# Patient Record
Sex: FEMALE | Race: WHITE | NOT HISPANIC OR LATINO | Employment: FULL TIME | URBAN - METROPOLITAN AREA
[De-identification: names, ages, dates, MRNs, and addresses within clinical notes are randomized per-mention and may not be internally consistent; named-entity substitution may affect disease eponyms.]

---

## 2017-02-23 ENCOUNTER — APPOINTMENT (OUTPATIENT)
Dept: MAMMOGRAPHY | Facility: HOSPITAL | Age: 62
End: 2017-02-23
Attending: OBSTETRICS & GYNECOLOGY

## 2017-04-06 ENCOUNTER — HOSPITAL ENCOUNTER (OUTPATIENT)
Dept: MAMMOGRAPHY | Facility: HOSPITAL | Age: 62
Discharge: HOME | End: 2017-04-06
Attending: OBSTETRICS & GYNECOLOGY | Admitting: OBSTETRICS & GYNECOLOGY

## 2017-04-06 DIAGNOSIS — R92.8 ABNORMAL MAMMOGRAM: ICD-10-CM

## 2017-04-06 PROCEDURE — G0204 DX MAMMO INCL CAD BI: HCPCS

## 2017-04-06 PROCEDURE — 77066 DX MAMMO INCL CAD BI: CPT | Performed by: RADIOLOGY

## 2017-04-06 PROCEDURE — 77062 BREAST TOMOSYNTHESIS BI: CPT | Performed by: RADIOLOGY

## 2017-04-06 PROCEDURE — G0279 TOMOSYNTHESIS, MAMMO: HCPCS

## 2018-12-28 ENCOUNTER — TRANSCRIBE ORDERS (OUTPATIENT)
Dept: ADMINISTRATIVE | Facility: HOSPITAL | Age: 63
End: 2018-12-28

## 2018-12-28 DIAGNOSIS — Z12.31 VISIT FOR SCREENING MAMMOGRAM: Primary | ICD-10-CM

## 2019-01-08 ENCOUNTER — HOSPITAL ENCOUNTER (OUTPATIENT)
Dept: MAMMOGRAPHY | Facility: HOSPITAL | Age: 64
Discharge: HOME | End: 2019-01-08
Attending: OBSTETRICS & GYNECOLOGY | Admitting: OBSTETRICS & GYNECOLOGY

## 2019-01-08 DIAGNOSIS — Z12.31 VISIT FOR SCREENING MAMMOGRAM: ICD-10-CM

## 2019-01-08 PROCEDURE — 77067 SCR MAMMO BI INCL CAD: CPT

## 2019-01-08 PROCEDURE — 77063 BREAST TOMOSYNTHESIS BI: CPT

## 2019-01-08 PROCEDURE — 77063 BREAST TOMOSYNTHESIS BI: CPT | Performed by: RADIOLOGY

## 2019-01-08 PROCEDURE — 77067 SCR MAMMO BI INCL CAD: CPT | Performed by: RADIOLOGY

## 2019-03-18 ENCOUNTER — APPOINTMENT (OUTPATIENT)
Dept: MRI IMAGING | Facility: HOSPITAL | Age: 64
End: 2019-03-18

## 2019-03-18 ENCOUNTER — HOSPITAL ENCOUNTER (OUTPATIENT)
Facility: HOSPITAL | Age: 64
Setting detail: OBSERVATION
Discharge: HOME | End: 2019-03-20
Attending: INTERNAL MEDICINE | Admitting: HOSPITALIST

## 2019-03-18 DIAGNOSIS — Z78.9 IMPAIRED MOBILITY AND ADLS: ICD-10-CM

## 2019-03-18 DIAGNOSIS — Z74.09 IMPAIRED MOBILITY AND ADLS: ICD-10-CM

## 2019-03-18 DIAGNOSIS — Z74.09 IMPAIRED FUNCTIONAL MOBILITY, BALANCE, GAIT, AND ENDURANCE: Primary | ICD-10-CM

## 2019-03-18 PROBLEM — R11.2 NAUSEA & VOMITING: Status: ACTIVE | Noted: 2019-03-18

## 2019-03-18 PROBLEM — G93.40 ACUTE ENCEPHALOPATHY: Status: ACTIVE | Noted: 2019-03-18

## 2019-03-18 PROCEDURE — 93010 ELECTROCARDIOGRAM REPORT: CPT | Performed by: INTERNAL MEDICINE

## 2019-03-18 PROCEDURE — 70551 MRI BRAIN STEM W/O DYE: CPT

## 2019-03-18 PROCEDURE — 99219 PR INITIAL OBSERVATION CARE/DAY 50 MINUTES: CPT | Performed by: FAMILY MEDICINE

## 2019-03-18 PROCEDURE — 70544 MR ANGIOGRAPHY HEAD W/O DYE: CPT

## 2019-03-18 PROCEDURE — 70547 MR ANGIOGRAPHY NECK W/O DYE: CPT

## 2019-03-18 PROCEDURE — G0378 HOSPITAL OBSERVATION PER HR: HCPCS

## 2019-03-18 PROCEDURE — 93005 ELECTROCARDIOGRAM TRACING: CPT | Performed by: FAMILY MEDICINE

## 2019-03-18 PROCEDURE — G0379 DIRECT REFER HOSPITAL OBSERV: HCPCS

## 2019-03-18 RX ORDER — ASPIRIN 81 MG/1
81 TABLET, CHEWABLE ORAL DAILY
COMMUNITY
End: 2022-08-16

## 2019-03-18 RX ORDER — ACETAMINOPHEN 650 MG/1
650 SUPPOSITORY RECTAL EVERY 4 HOURS PRN
Status: DISCONTINUED | OUTPATIENT
Start: 2019-03-18 | End: 2019-03-20 | Stop reason: HOSPADM

## 2019-03-18 RX ORDER — ASPIRIN 81 MG/1
81 TABLET, CHEWABLE ORAL DAILY
Status: DISCONTINUED | OUTPATIENT
Start: 2019-03-18 | End: 2019-03-20 | Stop reason: HOSPADM

## 2019-03-18 RX ORDER — MAGNESIUM SULFATE HEPTAHYDRATE 40 MG/ML
2 INJECTION, SOLUTION INTRAVENOUS AS NEEDED
Status: DISCONTINUED | OUTPATIENT
Start: 2019-03-18 | End: 2019-03-20 | Stop reason: HOSPADM

## 2019-03-18 RX ORDER — SODIUM CHLORIDE 0.9 % (FLUSH) 0.9 %
3-10 SYRINGE (ML) INJECTION AS NEEDED
Status: DISCONTINUED | OUTPATIENT
Start: 2019-03-18 | End: 2019-03-20 | Stop reason: HOSPADM

## 2019-03-18 RX ORDER — CALCIUM CARBONATE 200(500)MG
2 TABLET,CHEWABLE ORAL 2 TIMES DAILY PRN
Status: DISCONTINUED | OUTPATIENT
Start: 2019-03-18 | End: 2019-03-20 | Stop reason: HOSPADM

## 2019-03-18 RX ORDER — POTASSIUM CHLORIDE 1.5 G/1.77G
40 POWDER, FOR SOLUTION ORAL AS NEEDED
Status: DISCONTINUED | OUTPATIENT
Start: 2019-03-18 | End: 2019-03-20 | Stop reason: HOSPADM

## 2019-03-18 RX ORDER — POTASSIUM CHLORIDE 750 MG/1
40 CAPSULE, EXTENDED RELEASE ORAL AS NEEDED
Status: DISCONTINUED | OUTPATIENT
Start: 2019-03-18 | End: 2019-03-20 | Stop reason: HOSPADM

## 2019-03-18 RX ORDER — BISACODYL 5 MG/1
5 TABLET, DELAYED RELEASE ORAL DAILY PRN
Status: DISCONTINUED | OUTPATIENT
Start: 2019-03-18 | End: 2019-03-20 | Stop reason: HOSPADM

## 2019-03-18 RX ORDER — CHOLECALCIFEROL (VITAMIN D3) 125 MCG
5 CAPSULE ORAL NIGHTLY PRN
Status: DISCONTINUED | OUTPATIENT
Start: 2019-03-18 | End: 2019-03-20 | Stop reason: HOSPADM

## 2019-03-18 RX ORDER — MAGNESIUM SULFATE HEPTAHYDRATE 40 MG/ML
4 INJECTION, SOLUTION INTRAVENOUS AS NEEDED
Status: DISCONTINUED | OUTPATIENT
Start: 2019-03-18 | End: 2019-03-20 | Stop reason: HOSPADM

## 2019-03-18 RX ORDER — BISACODYL 10 MG
10 SUPPOSITORY, RECTAL RECTAL DAILY PRN
Status: DISCONTINUED | OUTPATIENT
Start: 2019-03-18 | End: 2019-03-20 | Stop reason: HOSPADM

## 2019-03-18 RX ORDER — POTASSIUM CHLORIDE 7.45 MG/ML
10 INJECTION INTRAVENOUS
Status: DISCONTINUED | OUTPATIENT
Start: 2019-03-18 | End: 2019-03-20 | Stop reason: HOSPADM

## 2019-03-18 RX ORDER — ONDANSETRON 2 MG/ML
4 INJECTION INTRAMUSCULAR; INTRAVENOUS EVERY 6 HOURS PRN
Status: DISCONTINUED | OUTPATIENT
Start: 2019-03-18 | End: 2019-03-20 | Stop reason: HOSPADM

## 2019-03-18 RX ORDER — ATORVASTATIN CALCIUM 40 MG/1
80 TABLET, FILM COATED ORAL NIGHTLY
Status: DISCONTINUED | OUTPATIENT
Start: 2019-03-18 | End: 2019-03-20 | Stop reason: HOSPADM

## 2019-03-18 RX ORDER — ACETAMINOPHEN 325 MG/1
650 TABLET ORAL EVERY 4 HOURS PRN
Status: DISCONTINUED | OUTPATIENT
Start: 2019-03-18 | End: 2019-03-20 | Stop reason: HOSPADM

## 2019-03-18 RX ORDER — SODIUM CHLORIDE 0.9 % (FLUSH) 0.9 %
3 SYRINGE (ML) INJECTION EVERY 12 HOURS SCHEDULED
Status: DISCONTINUED | OUTPATIENT
Start: 2019-03-18 | End: 2019-03-20 | Stop reason: HOSPADM

## 2019-03-18 RX ADMIN — ATORVASTATIN CALCIUM 80 MG: 40 TABLET, FILM COATED ORAL at 20:58

## 2019-03-18 RX ADMIN — ASPIRIN 81 MG 81 MG: 81 TABLET ORAL at 21:00

## 2019-03-18 RX ADMIN — SODIUM CHLORIDE, PRESERVATIVE FREE 3 ML: 5 INJECTION INTRAVENOUS at 20:59

## 2019-03-18 NOTE — PLAN OF CARE
Problem: Stroke (Ischemic) (Adult)  Goal: Signs and Symptoms of Listed Potential Problems Will be Absent, Minimized or Managed (Stroke)  Outcome: Ongoing (interventions implemented as appropriate)      Problem: Fall Risk (Adult)  Goal: Absence of Fall  Outcome: Ongoing (interventions implemented as appropriate)

## 2019-03-18 NOTE — HOSPITAL COURSE
4           [facility]  HISTORY AND PHYSICAL    Patient Name: Alexandrea Wiggins  : 1955  MRN: [id number]  Primary Care Physician: South Dickerson MD  Date of admission: 3/18/2019      Subjective   Subjective     Chief Complaint:  Acute encephalopathy    HPI:  Alexandrea Wiggins is a 63 y.o. female with essentially no past medical history.  This morning at approximately 5 AM she notified her  that she had been up during the night with nausea and had vomited 5 times.  He did notice the trash can beside her side of the bed before he went to work.  At about 645 the patient called her son-in-law and told him she would not be able to take her grandchildren to school because she had been upset all night.  She told him she was going to go back to bed and take a nap.  At approximately 745 the patient called the same son-in-law and told him she had overslept and was worried the children would be late for school.  She did not remember calling earlier and she also did not remember stating that she had been up with nausea and vomiting during the night.  15 minutes later she called him again, once again not remembering the 2 previous phone calls.    Shortly after, the patient's  returned home and again the patient denied any memory of having been up during the night with nausea and vomiting.  She also did not remember events of the previous day.  The decision was made to take the patient to the local emergency department which was at [facility].    At the outside ER, CT of the head is reported as nonacute.  UA and UDS were both negative.  WBC 9.2, H/H 14.7/44.8.  Platelets 263.  Sodium 137, potassium 3.8, glucose 123, creatinine 0.7.  Alcohol level was negative.  Magnesium 1.8.  TSH 0.9.  Troponin was less than 0.017.  The patient was oriented to person and self, but thought it was February.      She was sent to [facility] for higher level of care as well as  neurology consultation.    Review of Systems   The patient has otherwise been in her typical state of health.  No fever, chills, sweats, headache, Sz, LOC, cough, SOA, CP, abdominal pain, N/V/D/constipation, no skin breakdown.  No recent stressors.      Otherwise complete ROS reviewed and is negative except as mentioned in the HPI.    Personal History     History reviewed. No pertinent past medical history.    Past Surgical History:   Procedure Laterality Date   • REDUCTION MAMMAPLASTY         Family History: family history includes Breast cancer (age of onset: 53) in her sister; Heart disease in her father; Hypertension in her father and mother. Otherwise pertinent FHx was reviewed and unremarkable.     Social History:  reports that she has quit smoking. She does not have any smokeless tobacco history on file. She reports that she drinks alcohol. She reports that she does not use drugs.  Social History     Social History Narrative   • Not on file       Medications:    Available home medication information reviewed.  Medications Prior to Admission   Medication Sig Dispense Refill Last Dose   • aspirin 81 MG chewable tablet Chew 81 mg Daily.   3/17/2019 at Unknown time       No Known Allergies    Objective   Objective     Vital Signs:   Temp:  [98.5 °F (36.9 °C)] 98.5 °F (36.9 °C)  Resp:  [20] 20   Total (NIH Stroke Scale): 1    Physical Exam   Constitutional: Awake, alert  Eyes: PERRLA, sclerae anicteric, no conjunctival injection  HENT: NCAT, mucous membranes moist  Neck: Supple, no thyromegaly, no lymphadenopathy, trachea midline  Respiratory: Clear to auscultation bilaterally, nonlabored respirations   Cardiovascular: RRR, 2/6 systolic murmur, palpable pedal pulses bilaterally  Gastrointestinal: Positive bowel sounds, soft, nontender, nondistended  Musculoskeletal: No bilateral ankle edema, no clubbing or cyanosis to extremities  Psychiatric: Appropriate affect, cooperative  Neurologic: Oriented x 3, strength  symmetric in all extremities, Cranial Nerves grossly intact to confrontation, speech clear  Skin: No rashes    The patient has recovered her memory of yesterday, however family at bedside states she is still asking repeated questions and is more forgetful than usual.      Results Reviewed:  I have personally reviewed current lab, radiology, and data and agree.              Invalid input(s):  ALKPHOS  CrCl cannot be calculated (No order found.).  Brief Urine Lab Results     None        No results found for: BNP  Imaging Results (last 24 hours)     ** No results found for the last 24 hours. **             Assessment/Plan   Assessment / Plan     Active Hospital Problems    Diagnosis Date Noted   • **Acute encephalopathy [G93.40] 03/18/2019   • Nausea & vomiting [R11.2] 03/18/2019         Assessment/plan:  Ms. mcconnell is a 63-year-old  woman with essentially no past medical history who presented today with acute encephalopathy after a reported episode of nausea and vomiting.  Her mental status is improving but not quite back to baseline.    Acute encephalopathy:  -MRI brain without contrast, MRA head and neck without contrast  -Transthoracic echocardiogram  -Neurology consultation  -Every 4 hours neuro checks  -Aspirin and statin therapy  -Check A1c and lipid panel in the morning  -Consider TIA versus stroke versus TGA    Nausea/vomiting:  -Seems to be resolved    DVT prophylaxis:  Mechanical    CODE STATUS:    Code Status and Medical Interventions:   Ordered at: 03/18/19 1758     Code Status:    CPR     Medical Interventions (Level of Support Prior to Arrest):    Full       OBSERVATION status, however if further evaluation or treatment plans warrant, status may change.  Based upon current information, I predict patient's care encounter to be less than or equal to 2 midnights.      Electronically signed by Alejandrina Pierre MD, 03/18/19, 5:58 PM.

## 2019-03-18 NOTE — H&P
4           [facility]  HISTORY AND PHYSICAL    Patient Name: Alexandrea Wiggins  : 1955  MRN: [id number]  Primary Care Physician: South Dickerson MD  Date of admission: 3/18/2019      Subjective   Subjective     Chief Complaint:  Acute encephalopathy    HPI:  Alexandrea Wiggins is a 63 y.o. female with essentially no past medical history.  She awakened her  5 AM today to tell him she had been up during the night with nausea and vomiting and that she had vomited 5 times.  Prior to his departure for work he did notice that the trash can was placed by her side of the bed.  At approximately 645 the patient called her son-in-law to let him know that she had been up sick during the night and she would not be able to take the grandchildren to school.  She had told her son-in-law she was going to go back to bed and take a nap.  At approximately 745 she called her son-in-law again and stated somehow she had overslept and was afraid the children were going to be late to school.  She did not remember the previous conversation and did not remember telling him she had been up sick through the night.  Approximately 15 minutes later she called again stating that she was not able to  the grandchildren and was worried they would be late for school, once again forgetting the previous 2 conversations.  At approximately 830 the patient's  came home and he to found that she did not recall the events of the night or previous telephone conversations.  The family took her to the local emergency department which was at [facility].    Her to CT of the head was read as nonacute.  UDS and UA were both negative.  WBC 9.2, H/H 14.7/44.8 platelets 263.  Sodium 137, potassium 3.8, glucose 123, creatinine 0.7, liver function studies within normal limits.  Alcohol level was negative.  Magnesium 1.8, TSH 0.90, troponin negative.  The patient was alert and oriented to  self and place however, she thought it was February.  Additionally she could not recall the events of the day prior.    The decision was made to send her to [facility] for higher level of care and neurology consultation.    Review of Systems   No fever, chills, diaphoresis, URI symptoms, CP, SOA, cough, abdominal pain, N/V/D/constipation.  No dysuria.  No leg swelling.  No skin issues.  No new stressors.      Otherwise complete ROS reviewed and is negative except as mentioned in the HPI.    Personal History     History reviewed. No pertinent past medical history.    Past Surgical History:   Procedure Laterality Date   • REDUCTION MAMMAPLASTY         Family History: family history includes Breast cancer (age of onset: 53) in her sister; Heart disease in her father; Hypertension in her father and mother. Otherwise pertinent FHx was reviewed and unremarkable.     Social History:  reports that she has quit smoking. She does not have any smokeless tobacco history on file. She reports that she drinks alcohol. She reports that she does not use drugs.  Social History     Social History Narrative   • Not on file       Medications:    Available home medication information reviewed.  Medications Prior to Admission   Medication Sig Dispense Refill Last Dose   • aspirin 81 MG chewable tablet Chew 81 mg Daily.   3/17/2019 at Unknown time       No Known Allergies    Objective   Objective     Vital Signs:   Temp:  [98.5 °F (36.9 °C)] 98.5 °F (36.9 °C)  Resp:  [20] 20   Total (NIH Stroke Scale): 1    Physical Exam   Constitutional: Awake, alert  Eyes: PERRLA, sclerae anicteric, no conjunctival injection  HENT: NCAT, mucous membranes moist  Neck: Supple, no thyromegaly, no lymphadenopathy, trachea midline  Respiratory: Clear to auscultation bilaterally, nonlabored respirations   Cardiovascular: RRR, 2/6 systolic murmur, palpable pedal pulses bilaterally  Gastrointestinal: Positive bowel sounds, soft, nontender,  nondistended  Musculoskeletal: No bilateral ankle edema, no clubbing or cyanosis to extremities  Psychiatric: Appropriate affect, cooperative  Neurologic: Oriented x 3, strength symmetric in all extremities, Cranial Nerves grossly intact to confrontation, speech clear  Skin: No rashes    Patient's mental status greatly improved, but family at bedside states she is still very forgetful, asking similar questions repeatedly.      Results Reviewed:  I have personally reviewed current lab, radiology, and data and agree.              Invalid input(s):  ALKPHOS  CrCl cannot be calculated (No order found.).  Brief Urine Lab Results     None        No results found for: BNP  Imaging Results (last 24 hours)     ** No results found for the last 24 hours. **             Assessment/Plan   Assessment / Plan     Active Hospital Problems    Diagnosis Date Noted   • **Acute encephalopathy [G93.40] 03/18/2019   • Nausea & vomiting [R11.2] 03/18/2019         A/P:  Ms Wiggins is a 63 year old with essentially no medical history who presented to [facility] ER with acute encephalopathy with reported history of N/V through the night.    Acute encephalopathy:  - MRI brain, MRA head and neck  - TTE  - Neurology consultation  - Q4h neuro checks  - A1C and lipid panel in AM  - statin and ASA  - TIA versus stroke versus TGA    N/V, now resolved      DVT prophylaxis:  Mechanical    CODE STATUS:    Code Status and Medical Interventions:   Ordered at: 03/18/19 1758     Code Status:    CPR     Medical Interventions (Level of Support Prior to Arrest):    Full       OBSERVATION status, however if further evaluation or treatment plans warrant, status may change.  Based upon current information, I predict patient's care encounter to be less than or equal to 2 midnights.        Electronically signed by Alejandrina Pierre MD, 03/18/19, 6:15 PM.

## 2019-03-18 NOTE — NURSING NOTE
"  ACC REVIEW REPORT: [facility]        PATIENT NAME: Alexandrea Wiggins    PATIENT ID: [id number]    BED:     BED TYPE: TELE    BED GIVEN TO: JESSICA GALAN RN    TIME BED GIVEN:1220    YOB: 1955    AGE: 63 yrs    GENDER:FEMALE    PREVIOUS ADMIT TO [facility]:     PREVIOUS ADMISSION DATE:     PATIENT CLASS:     TODAY'S DATE: 3/18/2019    TRANSFER DATE: 03/18/2019    ETA:     TRANSFERRING FACILITY: [facility]    TRANSFERRING FACILITY PHONE # : [phone]    TRANSFERRING MD:     DATE/TIME REQUEST RECEIVED: 03/18/2019 @ 1152    [facility] RN: ABBIE PERZE    REPORT FROM: GERALDO GALAN RN     TIME REPORT TAKEN: 1218    DIAGNOSIS: AMS, POSSIBLE STROKE    REASON FOR TRANSFER TO [facility]: HIGHER LEVEL CARE    TRANSPORTATION: GROUND    CLINICAL REASON FOR TRANSFER TO [facility]: PATIENT IS NORMALLY A HEALTHY, ACTIVE PERSON, WHO IS STILL WORKING.  THIS MORNING ABOUT 5 AM, SHE WOKE UP WITH MENTAL STATUS CHANGES.  SHE TOLD HER  THAT SHE VOMITED 5 TIMES IN THE TRASH CAN.  SHE TOLD HER  THAT HE WAS GOING TO HAVE TO TAKE THE KIDS TO SCHOOL BECAUSE SHE DIDN'T FEEL WELL ENOUGH.  THEN SHE CALLED EACH FAMILY AND TOLD THEM THE SAME THING.  HER SON WAS GOING TO TAKE HER TO THE ER AND SHE WANTED TO SHOWER FIRST.  THEN SHE PROCEEDED TO JUST SIT DOWN IN THE BATHTUB WITHOUT ANY FURTHER ADIEU.  SHE IS UNABLE TO REMEMBER ANYTHING THAT HAPPENED YESTERDAY OR THE DAY BEFORE.  SHE IS ORIENTED TO HERSELF AND PLACE BUT THOUGHT THE MONTH WAS February.   CT HEAD IS NEGATIVE AND THERE ARE NO OTHER DEFICITS NOTED.  REPORTED THAT HER LAB RESULTS ARE NORMAL.  SHE TAKES NO MEDICATIONS.  SHE SAYS THAT HER HEAD FEELS \"FUZZY\" BUT DENIES HAVING A HEADACHE.  HER EKG SHOWED NSR.  HER ONLY HISTORY IS THAT SHE HAD A BREAST REDUCTION YEARS AGO.    HER URINE IS NORMAL AND HER UDS WAS NORMAL.        CLINICAL INFORMATION    HEIGHT:     WEIGHT:     ALLERGIES: NKDA    UP:     INFECTIOUS DISEASE:     ISOLATION:     1ST VITAL SIGNS:   TIME: "   TEMP:   PULSE:   B/P:   RESP:     LAST VITAL SIGNS:  TIME: 1230  TEMP: 98.1  PULSE: 83  B/P: 157/73  RESP: 18  97% ON RMA    LAB INFORMATION: WNL PER REPORT.    CULTURE INFORMATION:     MEDS/IV FLUIDS: # 20 G RAC WITH FLUIDS @ 250/ML/HR      CARDIAC SYSTEM:    CHEST PAIN:     RATE:     SCALE:     RHYTHM: NSR    Is patient taking or has patient been given any drugs that could increase bleeding? NO, SHE TAKES NO MEDS  (Plavix, Brilinta, Effient, Eliquis, Xarelto, Warfarin, Integrilin, Angiomax)    DRUG:      DOSE/FREQUENCY:     CARDIAC ENZYMES:    DATE:   TIME:   CK:   CKMB:   TJ:   TROP:     DATE:   TIME:   CK:   CKMB:   TJ:   TROP:         CARDIAC NOTES: NSR      RESPIRATORY SYSTEM:    LUNG SOUNDS:    CLEAR: YES  CRACKLES:   WHEEZES:   RHONCHI:   DIMINISHED:     ABG DATE:         ABG TIME:     ABG RESULTS:    PH:   PO2:   PCO2:   HCO3:   O2 SAT:           OXYGEN:     O2 SAT:     ADMINISTRATION ROUTE:     IMAGING FINDINGS:     PNEUMO LOCATION:     PNEUMO SIZE:     PNEUMO CHEST TUBE SEAL TYPE:     RADIOLOGY RESULTS:     RESPIRATORY STATUS:       CNS/MUSCULOSKELETAL    ALERT AND ORIENTED:    PERSON: YES  PLACE: YES  TIME: THINKS IT'S February.      INJURY:  WHERE:       STROKE SCALE:     SIZE OF HEMORRHAGE:     SYMPTOMS: (CHOOSE APPROPRIATE)    ASPHASIA:   ATAXIA:   DYSARTHRIA:   DYSPHASIA:   HEADACHE:   PARALYSIS:   SEIZURE:   SYNCOPE:   VERTIGO:   VISION CHANGE:        EXTREMITY WEAKNESS:    LEFT ARM:   RIGHT ARM:   LEFT LEG:   RIGHT LEG:     CAT SCAN RESULTS: NEGATIVE PER REPORT    MRI RESULTS:     CNS/MUSCULOSKELETAL NOTES:       GI//GY      ABDOMINAL PAIN:     VOMITING:     DIARRHEA:     NAUSEA:     BOWEL SOUNDS:     OCCULT STOOL:     VAGINAL BLEEDING:     TESTICULAR PAIN:     HEMATURIA:     NG TUBE:    SIZE:   DATE INSERTED:       ULTRASOUND:     ULTRASOUND RESULTS:       ACUTE ABDOMEN:     ACUTE ABDOMEN RESULTS:       CT SCAN:     CT SCAN RESULTS:       GI//GY NOTES:     PAST MEDICAL HISTORY: BREAST  REDUCTION, YEARS AGO.    OTHER SYMPTOM NOTES:     ADDITIONAL NOTES:           Ashly Kulkarni, RN  3/18/2019  12:04 PM

## 2019-03-19 ENCOUNTER — APPOINTMENT (OUTPATIENT)
Dept: CARDIOLOGY | Facility: HOSPITAL | Age: 64
End: 2019-03-19

## 2019-03-19 ENCOUNTER — APPOINTMENT (OUTPATIENT)
Dept: NEUROLOGY | Facility: HOSPITAL | Age: 64
End: 2019-03-19

## 2019-03-19 LAB
ANION GAP SERPL CALCULATED.3IONS-SCNC: 5 MMOL/L (ref 3–11)
ARTICHOKE IGE QN: 98 MG/DL (ref 0–130)
BH CV ECHO MEAS - AO ROOT AREA (BSA CORRECTED): 1.6
BH CV ECHO MEAS - AO ROOT AREA: 6.3 CM^2
BH CV ECHO MEAS - AO ROOT DIAM: 2.8 CM
BH CV ECHO MEAS - BSA(HAYCOCK): 1.9 M^2
BH CV ECHO MEAS - BSA: 1.8 M^2
BH CV ECHO MEAS - BZI_BMI: 28.7 KILOGRAMS/M^2
BH CV ECHO MEAS - BZI_METRIC_HEIGHT: 162.6 CM
BH CV ECHO MEAS - BZI_METRIC_WEIGHT: 75.8 KG
BH CV ECHO MEAS - EDV(CUBED): 72.7 ML
BH CV ECHO MEAS - EDV(TEICH): 77.4 ML
BH CV ECHO MEAS - EF(CUBED): 84.1 %
BH CV ECHO MEAS - EF(TEICH): 77.6 %
BH CV ECHO MEAS - ESV(CUBED): 11.6 ML
BH CV ECHO MEAS - ESV(TEICH): 17.4 ML
BH CV ECHO MEAS - FS: 45.8 %
BH CV ECHO MEAS - IVS/LVPW: 0.97
BH CV ECHO MEAS - IVSD: 1 CM
BH CV ECHO MEAS - LA DIMENSION: 3.2 CM
BH CV ECHO MEAS - LA/AO: 1.1
BH CV ECHO MEAS - LAD MAJOR: 4.6 CM
BH CV ECHO MEAS - LAT PEAK E' VEL: 9.3 CM/SEC
BH CV ECHO MEAS - LATERAL E/E' RATIO: 9.6
BH CV ECHO MEAS - LV MASS(C)D: 140.7 GRAMS
BH CV ECHO MEAS - LV MASS(C)DI: 77.7 GRAMS/M^2
BH CV ECHO MEAS - LVIDD: 4.2 CM
BH CV ECHO MEAS - LVIDS: 2.3 CM
BH CV ECHO MEAS - LVPWD: 1 CM
BH CV ECHO MEAS - MED PEAK E' VEL: 5.6 CM/SEC
BH CV ECHO MEAS - MEDIAL E/E' RATIO: 15.9
BH CV ECHO MEAS - MV A MAX VEL: 94.3 CM/SEC
BH CV ECHO MEAS - MV DEC SLOPE: 428.6 CM/SEC^2
BH CV ECHO MEAS - MV DEC TIME: 0.19 SEC
BH CV ECHO MEAS - MV E MAX VEL: 91.3 CM/SEC
BH CV ECHO MEAS - MV E/A: 0.97
BH CV ECHO MEAS - MV P1/2T MAX VEL: 135.7 CM/SEC
BH CV ECHO MEAS - MV P1/2T: 92.8 MSEC
BH CV ECHO MEAS - MVA P1/2T LCG: 1.6 CM^2
BH CV ECHO MEAS - MVA(P1/2T): 2.4 CM^2
BH CV ECHO MEAS - PA ACC SLOPE: 627.8 CM/SEC^2
BH CV ECHO MEAS - PA ACC TIME: 0.1 SEC
BH CV ECHO MEAS - PA PR(ACCEL): 32.7 MMHG
BH CV ECHO MEAS - RV MAX PG: 0.94 MMHG
BH CV ECHO MEAS - RV V1 MAX: 48.4 CM/SEC
BH CV ECHO MEAS - SI(CUBED): 33.8 ML/M^2
BH CV ECHO MEAS - SI(TEICH): 33.2 ML/M^2
BH CV ECHO MEAS - SV(CUBED): 61.2 ML
BH CV ECHO MEAS - SV(TEICH): 60.1 ML
BH CV ECHO MEAS - TAPSE (>1.6): 2.1 CM2
BH CV ECHO MEASUREMENTS AVERAGE E/E' RATIO: 12.26
BH CV XLRA - RV BASE: 3.5 CM
BH CV XLRA - RV LENGTH: 6 CM
BH CV XLRA - RV MID: 3 CM
BH CV XLRA - TDI S': 18 CM/SEC
BUN BLD-MCNC: 13 MG/DL (ref 9–23)
BUN/CREAT SERPL: 16.9 (ref 7–25)
CALCIUM SPEC-SCNC: 8.8 MG/DL (ref 8.7–10.4)
CHLORIDE SERPL-SCNC: 107 MMOL/L (ref 99–109)
CHOLEST SERPL-MCNC: 190 MG/DL (ref 0–200)
CO2 SERPL-SCNC: 27 MMOL/L (ref 20–31)
CREAT BLD-MCNC: 0.77 MG/DL (ref 0.6–1.3)
GFR SERPL CREATININE-BSD FRML MDRD: 75 ML/MIN/1.73
GLUCOSE BLD-MCNC: 96 MG/DL (ref 70–100)
GLUCOSE BLDC GLUCOMTR-MCNC: 84 MG/DL (ref 70–130)
GLUCOSE BLDC GLUCOMTR-MCNC: 87 MG/DL (ref 70–130)
HBA1C MFR BLD: 5.7 % (ref 4.8–5.6)
HDLC SERPL-MCNC: 79 MG/DL (ref 40–60)
LEFT ATRIUM VOLUME INDEX: 23.2 ML/M^2
LEFT ATRIUM VOLUME: 42 ML
LV EF 2D ECHO EST: 60 %
MAXIMAL PREDICTED HEART RATE: 156 BPM
POTASSIUM BLD-SCNC: 4.4 MMOL/L (ref 3.5–5.5)
SODIUM BLD-SCNC: 139 MMOL/L (ref 132–146)
STRESS TARGET HR: 133 BPM
TRIGL SERPL-MCNC: 62 MG/DL (ref 0–150)

## 2019-03-19 PROCEDURE — 99244 OFF/OP CNSLTJ NEW/EST MOD 40: CPT | Performed by: PSYCHIATRY & NEUROLOGY

## 2019-03-19 PROCEDURE — G0378 HOSPITAL OBSERVATION PER HR: HCPCS

## 2019-03-19 PROCEDURE — 80048 BASIC METABOLIC PNL TOTAL CA: CPT | Performed by: FAMILY MEDICINE

## 2019-03-19 PROCEDURE — 82962 GLUCOSE BLOOD TEST: CPT

## 2019-03-19 PROCEDURE — 93306 TTE W/DOPPLER COMPLETE: CPT

## 2019-03-19 PROCEDURE — 97165 OT EVAL LOW COMPLEX 30 MIN: CPT

## 2019-03-19 PROCEDURE — 95816 EEG AWAKE AND DROWSY: CPT

## 2019-03-19 PROCEDURE — 97161 PT EVAL LOW COMPLEX 20 MIN: CPT

## 2019-03-19 PROCEDURE — 99225 PR SBSQ OBSERVATION CARE/DAY 25 MINUTES: CPT | Performed by: HOSPITALIST

## 2019-03-19 PROCEDURE — 80061 LIPID PANEL: CPT | Performed by: FAMILY MEDICINE

## 2019-03-19 PROCEDURE — 93306 TTE W/DOPPLER COMPLETE: CPT | Performed by: INTERNAL MEDICINE

## 2019-03-19 PROCEDURE — 83036 HEMOGLOBIN GLYCOSYLATED A1C: CPT | Performed by: FAMILY MEDICINE

## 2019-03-19 RX ADMIN — MELATONIN TAB 5 MG 5 MG: 5 TAB at 22:20

## 2019-03-19 RX ADMIN — ATORVASTATIN CALCIUM 80 MG: 40 TABLET, FILM COATED ORAL at 22:20

## 2019-03-19 RX ADMIN — ASPIRIN 81 MG 81 MG: 81 TABLET ORAL at 08:32

## 2019-03-19 RX ADMIN — SODIUM CHLORIDE, PRESERVATIVE FREE 3 ML: 5 INJECTION INTRAVENOUS at 22:21

## 2019-03-19 RX ADMIN — SODIUM CHLORIDE, PRESERVATIVE FREE 3 ML: 5 INJECTION INTRAVENOUS at 08:32

## 2019-03-19 NOTE — CONSULTS
Patient does not meet diabetes education order criteria, therefore patient was not seen for diabetes education at this time.  Please re consult as needed. Current A1C is 5.7% Thank you for this referral.

## 2019-03-19 NOTE — PLAN OF CARE
Problem: Patient Care Overview  Goal: Plan of Care Review  Outcome: Outcome(s) achieved Date Met: 03/19/19 03/19/19 0920   Coping/Psychosocial   Plan of Care Reviewed With patient;daughter   OTHER   Outcome Summary Pt did not demonstrate any motor , balance, sensory or functional deficits during eval. No further OT warranted at this time.       Problem: Stroke (Ischemic) (Adult)  Goal: Signs and Symptoms of Listed Potential Problems Will be Absent, Minimized or Managed (Stroke)  Outcome: Outcome(s) achieved Date Met: 03/19/19 03/19/19 0920   Goal/Outcome Evaluation   Problems Assessed (Stroke (Ischemic)) acute neurologic deterioration;communication impairment;cognitive impairment;motor/sensory impairment;muscle tone abnormal   Problems Assessed (Stroke (Ischemic)) none

## 2019-03-19 NOTE — PROGRESS NOTES
[facility] Medicine Services  PROGRESS NOTE    Patient Name: Alexandrea Wiggins  : 1955  MRN: [id number]    Date of Admission: 3/18/2019  Length of Stay: 0  Primary Care Physician: [place] Dickerson MD    Subjective   Subjective     CC:  Acute Altered Mental Status    HPI:  Seen by Neurology today.  EEG pending.  Several family members in room today.    Review of Systems    Gen- No fevers, chills  CV- No chest pain, palpitations  Resp- No cough, dyspnea  GI- No N/V/D, abd pain    Otherwise ROS is negative except as mentioned in the HPI.    Objective   Objective     Vital Signs:   Temp:  [97.4 °F (36.3 °C)-98.3 °F (36.8 °C)] 98.3 °F (36.8 °C)  Heart Rate:  [71-81] 71  Resp:  [15-17] 17  BP: (112-152)/(48-75) 135/75  Total (NIH Stroke Scale): 0     Physical Exam:    Constitutional: No acute distress, awake, alert  HENT: NCAT, mucous membranes moist  Respiratory: Clear to auscultation bilaterally, respiratory effort normal   Cardiovascular: RRR, no murmurs, rubs, or gallops, palpable pedal pulses bilaterally  Gastrointestinal: Positive bowel sounds, soft, nontender, nondistended  Musculoskeletal: No bilateral ankle edema  Psychiatric: Appropriate affect, cooperative  Neurologic: Oriented x 3, strength symmetric in all extremities, Cranial Nerves grossly intact to confrontation, speech clear  Skin: No rashes    Results Reviewed:  I have personally reviewed current lab, radiology, and data and agree.        Results from last 7 days   Lab Units 19  0503   SODIUM mmol/L 139   POTASSIUM mmol/L 4.4   CHLORIDE mmol/L 107   CO2 mmol/L 27.0   BUN mg/dL 13   CREATININE mg/dL 0.77   GLUCOSE mg/dL 96   CALCIUM mg/dL 8.8     Estimated Creatinine Clearance: 75.3 mL/min (by C-G formula based on SCr of 0.77 mg/dL).    No results found for: BNP    Microbiology Results Abnormal     None          Imaging Results (last 24 hours)     Procedure Component Value Units Date/Time    MRI Angiogram Head  Without Contrast [[id number]] Collected:  03/18/19 2229     Updated:  03/18/19 2336    Narrative:       EXAM:    MR Angiogram Head Without Contrast, Arteries     EXAM DATE/TIME:    3/18/2019 10:29 PM     CLINICAL HISTORY:    63 years old, female; Signs and symptoms; Other: Stroke; Patient HX: Acute   encephalopathy memory loss evaluate for CVA; Additional info: Stroke. Acute   encephalopathy memory loss evaluate for CVA     TECHNIQUE:    MR angiogram head without contrast. Exam focused on the arteries.     COMPARISON:    No relevant prior studies available.     FINDINGS:    Right internal carotid artery: Unremarkable. Intracranial segment is patent   with no significant stenosis. No aneurysm.    Right anterior cerebral artery: Unremarkable. No occlusion or significant   stenosis. No aneurysm.    Right middle cerebral artery: Unremarkable. No occlusion or significant   stenosis. No aneurysm.    Right posterior cerebral artery: Unremarkable. No occlusion or significant   stenosis. No aneurysm.    Right vertebral artery: Unremarkable. No occlusion or significant stenosis. No   aneurysm.      Left internal carotid artery: Unremarkable. Intracranial segment is patent   with no significant stenosis. No aneurysm.    Left anterior cerebral artery: Unremarkable. No occlusion or significant   stenosis. No aneurysm.    Left middle cerebral artery: Unremarkable. No occlusion or significant   stenosis. No aneurysm.    Left posterior cerebral artery: Unremarkable. No occlusion or significant   stenosis. No aneurysm.    Left vertebral artery:  The distal intracranial left vertebral artery is   hypoplastic but patent.    Basilar artery: Unremarkable. No occlusion or significant stenosis. No   aneurysm.       Impression:       No significant intracranial stenosis or evidence of large vessel occlusion.    THIS DOCUMENT HAS BEEN ELECTRONICALLY SIGNED BY DULCE SÁNCHEZ MD    MRI Angiogram Neck Without Contrast [[id number]] Collected:   03/18/19 2232     Updated:  03/18/19 2333    Narrative:       EXAM:    MR Angiography Neck Without Contrast     EXAM DATE/TIME:    3/18/2019 10:32 PM     CLINICAL HISTORY:    63 years old, female; Signs and symptoms; Other: Stroke; Patient HX: Acute   encephalopathy memory loss evaluate for CVA; Additional info: Stroke. Acute   encephalopathy memory loss evaluate for CVA     TECHNIQUE:    Magnetic resonance angiography images of the neck without intravenous   contrast.     COMPARISON:    MRI ANGIOGRAM HEAD WO CONTRAST 3/18/2019 10:09 PM     FINDINGS:    Right common carotid artery: Normal. No significant stenosis. No dissection or   occlusion.    Right internal carotid artery: Normal. Extracranial segment is patent with no   significant stenosis. No dissection or occlusion.    Right external carotid artery:  Mild stenosis at the origin of the right   external carotid artery.    Right vertebral artery: Normal. No significant stenosis. No dissection or   occlusion.    Left common carotid artery: Normal. No significant stenosis. No dissection or   occlusion.    Left internal carotid artery: Normal. Extracranial segment is patent with no   significant stenosis. No dissection or occlusion.    Left external carotid artery:  Mild stenosis at the origin of the left   external carotid artery.    Left vertebral artery:  No significant stenosis. No dissection or occlusion.   Decreased signal of the intracranial segment is likely artifactual as the   vessel appears patent on the MRA of the head        Impression:       No hemodynamically significant stenosis of the internal carotid arteries by   NASCET criteria.    COMMENT:    Reference per NASCET criteria for degree of stenosis: Mild: less than 50%   stenosis. Moderate: 50-69% stenosis. Severe: 70-94% stenosis. Near occlusion:   95-99% stenosis.     THIS DOCUMENT HAS BEEN ELECTRONICALLY SIGNED BY DULCE SÁNCHEZ MD    MRI Brain Without Contrast [[id number]] Collected:  03/18/19  2232     Updated:  03/18/19 2324    Narrative:       EXAM:    MR Head Without Contrast     EXAM DATE/TIME:    3/18/2019 10:32 PM     CLINICAL HISTORY:    63 years old, female; Signs and symptoms; Other: Stroke; Patient HX: Acute   encephalopathy memory loss evaluate for CVA; Additional info: Stroke. Acute   encephalopathy memory loss evaluate for CVA     TECHNIQUE:    MR of the head without contrast.     COMPARISON:    None     FINDINGS:    Brain:  No acute infarction. No evidence of hemorrhage, mass effect, or edema.   There are minimal periventricular and subcortical areas of T2/flair high signal   consistent with chronic small vessel ischemic disease.    Ventricles: Normal. No ventriculomegaly.    Bones/joints: Unremarkable.    Soft tissues: Normal.    Sinuses: Normal as visualized. No acute sinusitis.    Mastoid air cells: Normal as visualized. No mastoid effusion.    Orbits: Unremarkable.         Impression:       No acute infarction    Chronic small vessel ischemic disease    THIS DOCUMENT HAS BEEN ELECTRONICALLY SIGNED BY DULCE SÁNCHEZ MD          Results for orders placed during the hospital encounter of 03/18/19   Adult Transthoracic Echo Complete W/ Cont if Necessary Per Protocol (With Agitated Saline)    Narrative · Estimated EF = 60%.  · Left ventricular systolic function is normal.  · Trace mitral and tricuspid regurgitation  · Trace to mild aortic insufficiency  · No cardiac source for embolus is seen          I have reviewed the medications:    Current Facility-Administered Medications:   •  acetaminophen (TYLENOL) tablet 650 mg, 650 mg, Oral, Q4H PRN **OR** acetaminophen (TYLENOL) suppository 650 mg, 650 mg, Rectal, Q4H PRN, Alejandrina Pierre MD  •  aspirin chewable tablet 81 mg, 81 mg, Oral, Daily, Alejandrina Pierre MD, 81 mg at 03/19/19 0832  •  atorvastatin (LIPITOR) tablet 80 mg, 80 mg, Oral, Nightly, Alejandrina Pierre MD, 80 mg at 03/18/19 4338  •  bisacodyl (DULCOLAX) EC tablet 5 mg, 5 mg,  Oral, Daily PRN, Alejandrina Pierre MD  •  bisacodyl (DULCOLAX) suppository 10 mg, 10 mg, Rectal, Daily PRN, Alejandrina Pierre MD  •  calcium carbonate (TUMS) chewable tablet 500 mg (200 mg elemental), 2 tablet, Oral, BID PRN, Alejandrina Pierre MD  •  Magnesium Sulfate 2 gram Bolus, followed by 8 gram infusion (total Mg dose 10 grams)- Mg less than or equal to 1mg/dL, 2 g, Intravenous, PRN **OR** Magnesium Sulfate 2 gram / 50mL Infusion (GIVE X 3 BAGS TO EQUAL 6GM TOTAL DOSE) - Mg 1.1 - 1.5 mg/dl, 2 g, Intravenous, PRN **OR** Magnesium Sulfate 4 gram infusion- Mg 1.6-1.9 mg/dL, 4 g, Intravenous, PRN, Alejandrina Pierre MD  •  melatonin tablet 5 mg, 5 mg, Oral, Nightly PRN, Alejandrina Pierre MD  •  ondansetron (ZOFRAN) injection 4 mg, 4 mg, Intravenous, Q6H PRN, Alejandrina Pierre MD  •  potassium chloride (MICRO-K) CR capsule 40 mEq, 40 mEq, Oral, PRN **OR** potassium chloride (KLOR-CON) packet 40 mEq, 40 mEq, Oral, PRN **OR** potassium chloride 10 mEq in 100 mL IVPB, 10 mEq, Intravenous, Q1H PRN, Alejandrina Pierre MD  •  sodium chloride 0.9 % flush 3 mL, 3 mL, Intravenous, Q12H, Alejandrina Pierre MD, 3 mL at 03/19/19 0832  •  sodium chloride 0.9 % flush 3 mL, 3 mL, Intravenous, Q12H, Alejandrina Pierre MD  •  sodium chloride 0.9 % flush 3-10 mL, 3-10 mL, Intravenous, PRN, Alejandrina Pierre MD  •  sodium chloride 0.9 % flush 3-10 mL, 3-10 mL, Intravenous, PRN, Alejandrina Pierre MD      Assessment/Plan   Assessment / Plan     Active Hospital Problems    Diagnosis Date Noted   • **Acute encephalopathy [G93.40] 03/18/2019   • Nausea & vomiting [R11.2] 03/18/2019     Brief Hospital Course to date:  Alexandrea Wiggins is a 63 y.o. female who presented to [facility] ER with acute encephalopathy with reported history of N/V through the night.     Acute encephalopathy:  - MRI brain, MRA head and neck  - TTE  - Neurology consultation  - Q4h neuro checks  - A1C and lipid panel in AM  - statin and ASA  - TIA versus stroke versus  TGA     N/V, now resolved    EEG pending    DVT Prophylaxis:  SCDs    Disposition: I expect the patient to be discharged on Wednesday    CODE STATUS:   Code Status and Medical Interventions:   Ordered at: 03/18/19 1758     Code Status:    CPR     Medical Interventions (Level of Support Prior to Arrest):    Full         Electronically signed by Jorge Phillip MD, 03/19/19, 5:18 PM.

## 2019-03-19 NOTE — CONSULTS
Neurology    Referring Provider: Gwen Lazaro DO    Reason for Consultation: Sudden onset of confusion and memory loss.    Chief complaint: Sudden onset of memory loss.    HPI:  Patient is a 63-year-old female without any significant past medical history.  She was doing fine until about yesterday night where she stayed awake almost all night throwing up.  As per the , she threw up 5 times.  She does not remember throwing up throughout the night though.  At around 6:00 in the morning,  left for work and around 745, she called her son-in-law informing that she would not be able to  her grandchildren from school today she is not well.  Half an hour later, she woke up thinking that she never spoke with her son-in-law so called him again and informed the same thing.  Son-in-law reassured her that she had already spoken with her about half an hour ago and there is nothing to worry.  Again in another 30 minutes, she called him again stating the same fact.   came back home at around 930 and found that she does not remember or recall any of the events that has been happening overnight including her calling son-in-law 3 times asking for the grandchildren.  Following this,  took her to the emergency department at [facility] and CT head was done which was unremarkable.  Urine drug screen, UA were both negative as well.  She was then eventually transferred to [facility] for further care.  Since admission to the hospital, she has had MRI brain, MR angiogram of brain and neck as well as 2D echocardiogram which were all essentially unremarkable.  She reports that she feels much better and does not report any problems with memory at present.  She does report having a lot of stress related to her work.  Denies any recent history of head injury or any other stressors.       Current Facility-Administered Medications:   •  acetaminophen (TYLENOL) tablet 650 mg, 650 mg, Oral, Q4H  PRN **OR** acetaminophen (TYLENOL) suppository 650 mg, 650 mg, Rectal, Q4H PRN, Alejandrina Pierre MD  •  aspirin chewable tablet 81 mg, 81 mg, Oral, Daily, Alejandrina Pierer MD, 81 mg at 03/19/19 0832  •  atorvastatin (LIPITOR) tablet 80 mg, 80 mg, Oral, Nightly, Alejandrina Pierre MD, 80 mg at 03/18/19 2058  •  bisacodyl (DULCOLAX) EC tablet 5 mg, 5 mg, Oral, Daily PRN, Alejandrina Pierre MD  •  bisacodyl (DULCOLAX) suppository 10 mg, 10 mg, Rectal, Daily PRN, Alejandrina Pierre MD  •  calcium carbonate (TUMS) chewable tablet 500 mg (200 mg elemental), 2 tablet, Oral, BID PRN, Alejandrina Pierre MD  •  Magnesium Sulfate 2 gram Bolus, followed by 8 gram infusion (total Mg dose 10 grams)- Mg less than or equal to 1mg/dL, 2 g, Intravenous, PRN **OR** Magnesium Sulfate 2 gram / 50mL Infusion (GIVE X 3 BAGS TO EQUAL 6GM TOTAL DOSE) - Mg 1.1 - 1.5 mg/dl, 2 g, Intravenous, PRN **OR** Magnesium Sulfate 4 gram infusion- Mg 1.6-1.9 mg/dL, 4 g, Intravenous, PRN, Alejandrina Pierre MD  •  melatonin tablet 5 mg, 5 mg, Oral, Nightly PRN, Alejandrina Pierre MD  •  ondansetron (ZOFRAN) injection 4 mg, 4 mg, Intravenous, Q6H PRN, Alejandrina Pierre MD  •  potassium chloride (MICRO-K) CR capsule 40 mEq, 40 mEq, Oral, PRN **OR** potassium chloride (KLOR-CON) packet 40 mEq, 40 mEq, Oral, PRN **OR** potassium chloride 10 mEq in 100 mL IVPB, 10 mEq, Intravenous, Q1H PRN, Alejandrina Pierre MD  •  sodium chloride 0.9 % flush 3 mL, 3 mL, Intravenous, Q12H, Alejandrina Pierre MD, 3 mL at 03/19/19 0832  •  sodium chloride 0.9 % flush 3 mL, 3 mL, Intravenous, Q12H, Alejandrina Pierre MD  •  sodium chloride 0.9 % flush 3-10 mL, 3-10 mL, Intravenous, PRN, Alejandrina Pierre MD  •  sodium chloride 0.9 % flush 3-10 mL, 3-10 mL, Intravenous, PRN, Alejandrina Pierre MD       History reviewed. No pertinent past medical history.     Past Surgical History:   Procedure Laterality Date   • REDUCTION MAMMAPLASTY          Family History   Problem Relation Age of  "Onset   • Breast cancer Sister 53   • Hypertension Mother    • Heart disease Father    • Hypertension Father    • Ovarian cancer Neg Hx         Social History     Socioeconomic History   • Marital status:      Spouse name: Not on file   • Number of children: Not on file   • Years of education: Not on file   • Highest education level: Not on file   Social Needs   • Financial resource strain: Not on file   • Food insecurity - worry: Not on file   • Food insecurity - inability: Not on file   • Transportation needs - medical: Not on file   • Transportation needs - non-medical: Not on file   Occupational History   • Not on file   Tobacco Use   • Smoking status: Former Smoker   Substance and Sexual Activity   • Alcohol use: Yes     Frequency: Monthly or less   • Drug use: No   • Sexual activity: Defer   Other Topics Concern   • Not on file   Social History Narrative   • Not on file       Review of Systems    Pertinent items are noted in HPI, all other systems reviewed and negative     Objective:    /63 (BP Location: Left arm, Patient Position: Lying)   Pulse 72   Temp 97.4 °F (36.3 °C) (Oral)   Resp 17   Ht 165 cm (64.96\")   Wt 76.2 kg (167 lb 15.9 oz)   SpO2 96%   BMI 27.99 kg/m²      Exam:     Cardiovascular:  No rubs,gallops or murmurs. Regular rate and rhythm.     Neurology Exam:    General apperance: NAD.     Mental status: Alert, awake and oriented to time place and person.    Recent and Remote memory: Intact.    Attention span and Concentration: Normal.     Language and Speech: Intact- No dysarthria.    Fluency, Naming , Repitition and Comprehension:  Intact    Cranial Nerves:   CN II: Visual fields are full. Intact. Pupils - ANN  CN III, IV and VI: Extraocular movements are intact. Normal saccades.   CN V: Facial sensation is intact.   CN VII: Muscles of facial expression reveal no asymmetry. Intact.   CN VIII: Hearing is intact. Whispered voice intact.   CN IX and X: Palate elevates " symmetrically. Intact  CN XI: Shoulder shrug is intact.   CN XII: Tongue is midline without evidence of atrophy or fasciculation.     Ophthalmoscopic Exam: Fundi - Normal, No papillederma    Motor:  Right UE muscle strength 5/5. Normal tone.     Left UE muscle strength 5/5. Normal tone.      Right LE muscle strength5/5. Normal tone.     Left LE muscle strength 5/5. Normal tone.      Sensory: Normal light touch, vibration and pinprick sensation bilaterally.    DTRs: 2+ bilaterally in upper and lower extremities.    Babinski: Negative bilaterally.    Co-ordination: Normal finger-to-nose, heel to shin B/L.    Rhomberg: Negative.    Gait: Normal.    Results Reviewed:     Labs:  Most recent labs have been reviewed.        Results from last 7 days   Lab Units 03/19/19  0503   SODIUM mmol/L 139   POTASSIUM mmol/L 4.4   CHLORIDE mmol/L 107   CO2 mmol/L 27.0   BUN mg/dL 13   CREATININE mg/dL 0.77   CALCIUM mg/dL 8.8       Radiology: Mri Angiogram Head Without Contrast    Result Date: 3/18/2019  No significant intracranial stenosis or evidence of large vessel occlusion. THIS DOCUMENT HAS BEEN ELECTRONICALLY SIGNED BY DULCE SÁNCHEZ MD    Mri Angiogram Neck Without Contrast    Result Date: 3/18/2019  No hemodynamically significant stenosis of the internal carotid arteries by NASCET criteria. COMMENT:  Reference per NASCET criteria for degree of stenosis: Mild: less than 50% stenosis. Moderate: 50-69% stenosis. Severe: 70-94% stenosis. Near occlusion: 95-99% stenosis. THIS DOCUMENT HAS BEEN ELECTRONICALLY SIGNED BY DULCE SÁNCHEZ MD    Mri Brain Without Contrast    Result Date: 3/18/2019  No acute infarction Chronic small vessel ischemic disease THIS DOCUMENT HAS BEEN ELECTRONICALLY SIGNED BY DULCE SÁNCHEZ MD      I personally reviewed MRI brain, MR angiogram of brain and MR angiogram of neck which did not reveal any intracranial abnormalities.  There is no evidence of flow-limiting stenosis as well.    Labs reviewed.           Assessment/Plan     Transient global amnesia: Likely transient global amnesia in a patient with sudden onset of memory loss and inability to recall  events lasting for approximately 15-20 hours.  Currently she feels that she is back to baseline and does not report any problems with memory.   also feels that she is doing much better.  I have reassured her that MRI brain, MR angiogram brain and neck as well as 2D echocardiogram has been negative.  Have also explained that prognosis of TGA is very good and there is no permanent damage to the brain.  To complete the workup, EEG will be done to rule out possibility of complex partial seizures.  If EEG is normal then she can be discharged home.    I discussed the patients findings and my recommendations with patient, family and nursing staff    As part of this visit I reviewed prior lab results, reviewed radiology results, reviewed radiology images and reviewed records from the current hospitalization which is incorporated in the HPI. Please see above for details.      Geovanny Fernandez MD  03/19/19  2:06 PM

## 2019-03-19 NOTE — PLAN OF CARE
Problem: Patient Care Overview  Goal: Plan of Care Review  Outcome: Outcome(s) achieved Date Met: 03/19/19 03/19/19 0946   Coping/Psychosocial   Plan of Care Reviewed With patient;daughter   OTHER   Outcome Summary GOALS NOT ESTABLISHED AS PT IS AT BASELINE WITH FUNCTIONAL MOBILITY. NO DEFICITS IN STRENGTH, BALANCE, COORDINATION, OR FUNCTIONAL MOBILITY. PT IS A&OX 4. D/C P.T. PT IS SAFE TO BE UP AD GERTRUDIS.        Problem: Stroke (Ischemic) (Adult)  Goal: Signs and Symptoms of Listed Potential Problems Will be Absent, Minimized or Managed (Stroke)  Outcome: Ongoing (interventions implemented as appropriate)   03/19/19 0946   Goal/Outcome Evaluation   Problems Assessed (Stroke (Ischemic)) cognitive impairment;communication impairment;motor/sensory impairment;muscle tone abnormal   Problems Assessed (Stroke (Ischemic)) none

## 2019-03-19 NOTE — PLAN OF CARE
Problem: Patient Care Overview  Goal: Plan of Care Review  Outcome: Ongoing (interventions implemented as appropriate)   03/19/19 1617   Coping/Psychosocial   Plan of Care Reviewed With patient;family   Plan of Care Review   Progress improving   OTHER   Outcome Summary VSS, needed 2L O2 overnight, on RA during day. No c/o pain, N/V. A&Ox4. Up adlib. Echo done today, awaiting EEG. Family at bedside most of shift so far.        Problem: Stroke (Ischemic) (Adult)  Goal: Signs and Symptoms of Listed Potential Problems Will be Absent, Minimized or Managed (Stroke)  Outcome: Ongoing (interventions implemented as appropriate)   03/19/19 1617   Goal/Outcome Evaluation   Problems Assessed (Stroke (Ischemic)) all   Problems Assessed (Stroke (Ischemic)) none       Problem: Fall Risk (Adult)  Goal: Absence of Fall  Outcome: Ongoing (interventions implemented as appropriate)   03/19/19 1617   Fall Risk (Adult)   Absence of Fall achieves outcome

## 2019-03-19 NOTE — PLAN OF CARE
Problem: Patient Care Overview  Goal: Plan of Care Review  Outcome: Ongoing (interventions implemented as appropriate)   03/19/19 3083   Coping/Psychosocial   Plan of Care Reviewed With patient;family   Plan of Care Review   Progress improving   OTHER   Outcome Summary pt rested intermittently throughout night, alert and oriented x4, no c/o pain, no c/o n/v/d, vss, will continue to monitor.     Goal: Individualization and Mutuality  Outcome: Ongoing (interventions implemented as appropriate)    Goal: Discharge Needs Assessment  Outcome: Ongoing (interventions implemented as appropriate)      Problem: Stroke (Ischemic) (Adult)  Goal: Signs and Symptoms of Listed Potential Problems Will be Absent, Minimized or Managed (Stroke)  Outcome: Ongoing (interventions implemented as appropriate)      Problem: Fall Risk (Adult)  Goal: Identify Related Risk Factors and Signs and Symptoms  Outcome: Outcome(s) achieved Date Met: 03/19/19    Goal: Absence of Fall  Outcome: Ongoing (interventions implemented as appropriate)

## 2019-03-19 NOTE — THERAPY DISCHARGE NOTE
Acute Care - Occupational Therapy Initial Eval/Discharge  [facility]     Patient Name: Alexandrea Wiggins  : 1955  MRN: [id number]  Today's Date: 3/19/2019  Onset of Illness/Injury or Date of Surgery: 19  Date of Referral to OT: 19  Referring Physician: MD Ignacio      Admit Date: 3/18/2019       ICD-10-CM ICD-9-CM   1. Impaired functional mobility, balance, gait, and endurance Z74.09 V49.89   2. Impaired mobility and ADLs Z74.09 799.89     Patient Active Problem List   Diagnosis   • Acute encephalopathy   • Nausea & vomiting     History reviewed. No pertinent past medical history.  Past Surgical History:   Procedure Laterality Date   • REDUCTION MAMMAPLASTY            OT ASSESSMENT FLOWSHEET (last 72 hours)      Occupational Therapy Evaluation     Row Name 19 0920                   OT Evaluation Time/Intention    Subjective Information  no complaints  -AN        Document Type  discharge evaluation/summary  -AN        Mode of Treatment  occupational therapy  -AN        Patient Effort  excellent  -AN        Symptoms Noted During/After Treatment  none  -AN           General Information    Patient Profile Reviewed?  yes  -AN        Onset of Illness/Injury or Date of Surgery  19  -AN        Referring Physician  MD Ignacio  -AN        Patient Observations  alert;cooperative;agree to therapy  -AN        Patient/Family Observations  daughter present; pt in room with PT  -AN        Prior Level of Function  independent:;all household mobility;community mobility;gait;transfer;ADL's;bed mobility;home management;cooking;cleaning;driving;work works full time in sale for manufactured homes  -AN        Equipment Currently Used at Home  none  -AN        Pertinent History of Current Functional Problem  Pt to ED with AMS, episodes of vomiting. MRI (-)  -AN        Existing Precautions/Restrictions  no known precautions/restrictions  -AN        Risks Reviewed  patient and family:;LOB;dizziness;change in vital  signs;increased discomfort  -AN        Benefits Reviewed  patient and family:;improve function;increase independence;increase strength;increase balance  -AN        Barriers to Rehab  none identified  -AN           Relationship/Environment    Primary Source of Support/Comfort  spouse;child(osmel)  -AN        Lives With  spouse  -AN        Family Caregiver if Needed  spouse  -AN           Resource/Environmental Concerns    Current Living Arrangements  home/apartment/condo  -AN        Resource/Environmental Concerns  none  -AN           Home Main Entrance    Number of Stairs, Main Entrance  four  -AN           Stairs Within Home, Primary    Stairs, Within Home, Primary  12  -AN           Cognitive Assessment/Interventions    Additional Documentation  Cognitive Assessment/Intervention (Group)  -AN           Cognitive Assessment/Intervention- PT/OT    Affect/Mental Status (Cognitive)  WFL  -AN        Orientation Status (Cognition)  oriented x 4  -AN        Follows Commands (Cognition)  WNL  -AN        Personal Safety Interventions  fall prevention program maintained  -AN           Bed Mobility Assessment/Treatment    Comment (Bed Mobility)  pt up in chair  -AN           Functional Mobility    Functional Mobility- Ind. Level  independent  -AN        Functional Mobility-Distance (Feet)  20  -AN           Transfer Assessment/Treatment    Transfer Assessment/Treatment  sit-stand transfer;stand-sit transfer;shower transfer  -AN           Sit-Stand Transfer    Sit-Stand [place] (Transfers)  independent  -AN           Stand-Sit Transfer    Stand-Sit [place] (Transfers)  independent  -AN           Shower Transfer    Type (Shower Transfer)  lateral  -AN        [place] Level (Shower Transfer)  conditional independence simulated  -AN           ADL Assessment/Intervention    BADL Assessment/Intervention  lower body dressing;bathing  -AN           Bathing Assessment/Intervention    Bathing [place] Level  bathing  skills;conditional independence  -AN        Comment (Bathing)  simulated standing  -AN           Lower Body Dressing Assessment/Training    Lower Body Dressing [place] Level  lower body dressing skills;conditional independence  -AN        Comment (Lower Body Dressing)  simulated standing  -AN           General ROM    GENERAL ROM COMMENTS  Javier UE WFL  -AN           MMT (Manual Muscle Testing)    General MMT Comments  Javier UE WFL grossly 4+/5  -AN           Motor Assessment/Interventions    Additional Documentation  Balance (Group);Gross Motor Coordination (Group)  -AN           Gross Motor Coordination    Gross Motor Skill, Impairments Detail  GM WFL  -AN           Balance    Balance  static sitting balance;static standing balance;dynamic sitting balance;dynamic standing balance  -AN           Static Sitting Balance    Level of [place] (Unsupported Sitting, Static Balance)  independent  -AN           Dynamic Sitting Balance    Level of [place], Reaches Outside Midline (Sitting, Dynamic Balance)  independent  -AN           Static Standing Balance    Level of [place] (Supported Standing, Static Balance)  independent  -AN           Dynamic Standing Balance    Level of [place], Reaches Outside Midline (Standing, Dynamic Balance)  independent  -AN        Comment, Reaches Outside Midline (Standing, Dynamic Balance)  simulate shower tub txfrs,  item off floor  -AN           Sensory Assessment/Intervention    Sensory General Assessment  no sensation deficits identified  -AN        Additional Documentation  Vision Assessment/Intervention (Group)  -AN           Vision Assessment/Intervention    Visual Impairment/Limitations  WFL  -AN           Positioning and Restraints    Pre-Treatment Position  sitting in chair/recliner  -AN        Post Treatment Position  chair  -AN        In Chair  sitting;call light within reach;encouraged to call for assist;with family/caregiver  -AN           Pain Scale:  Numbers Pre/Post-Treatment    Pain Scale: Numbers, Pretreatment  0/10 - no pain  -AN        Pain Scale: Numbers, Post-Treatment  0/10 - no pain  -AN           Plan of Care Review    Plan of Care Reviewed With  patient;daughter  -AN           Clinical Impression (OT)    Date of Referral to OT  03/18/19  -AN        OT Diagnosis  NO functional deficits found  -AN        Patient/Family Goals Statement (OT Eval)  agreeable to OT  -AN        Criteria for Skilled Therapeutic Interventions Met (OT Eval)  no problems identified which require skilled intervention  -AN        Therapy Frequency (OT Eval)  evaluation only  -AN        Anticipated Discharge Disposition (OT)  home  -AN           Vital Signs    Pre Systolic BP Rehab  138  -AN        Pre Treatment Diastolic BP  88 following ambulation with PT  -AN           Living Environment    Home Accessibility  stairs within home  -AN          User Key  (r) = Recorded By, (t) = Taken By, (c) = Cosigned By    Initials Name Effective Dates    AN Raisa Hernandez, OT 06/22/15 -               OT Recommendation and Plan  Outcome Summary/Treatment Plan (OT)  Anticipated Discharge Disposition (OT): home  Therapy Frequency (OT Eval): evaluation only  Plan of Care Review  Plan of Care Reviewed With: patient, daughter  Plan of Care Reviewed With: patient, daughter  Outcome Summary: Pt did not demonstrate any motor , balance, sensory or functional deficits during eval. No further OT warranted at this time.         Outcome Measures     Row Name 03/19/19 0920 03/19/19 0906          How much help from another person do you currently need...    Turning from your back to your side while in flat bed without using bedrails?  --  4  -CD     Moving from lying on back to sitting on the side of a flat bed without bedrails?  --  4  -CD     Moving to and from a bed to a chair (including a wheelchair)?  --  4  -CD     Standing up from a chair using your arms (e.g., wheelchair, bedside chair)?  --  4  -CD      Climbing 3-5 steps with a railing?  --  4  -CD     To walk in hospital room?  --  4  -CD     AM-PAC 6 Clicks Score  --  24  -CD        How much help from another is currently needed...    Putting on and taking off regular lower body clothing?  4  -AN  --     Bathing (including washing, rinsing, and drying)  4  -AN  --     Toileting (which includes using toilet bed pan or urinal)  4  -AN  --     Putting on and taking off regular upper body clothing  4  -AN  --     Taking care of personal grooming (such as brushing teeth)  4  -AN  --     Eating meals  4  -AN  --     Score  24  -AN  --        Modified Nicky Scale    Pre-Stroke Modified Worth Scale  0 - No Symptoms at all.  -AN  0 - No Symptoms at all.  -CD     Modified [place] Scale  0 - No Symptoms at all.  -AN  0 - No Symptoms at all.  -CD        Functional Assessment    Outcome Measure Options  AM-PAC 6 Clicks Daily Activity (OT);Modified Worth  -AN  AM-PAC 6 Clicks Basic Mobility (PT);Modified Worth  -CD       User Key  (r) = Recorded By, (t) = Taken By, (c) = Cosigned By    Initials Name Provider Type    Brandy Wood, PT Physical Therapist    Raisa Rivera OT Occupational Therapist          Time Calculation:   Time Calculation- OT     Row Name 03/19/19 0948             Time Calculation- OT    OT Start Time  0920  -AN      Total Timed Code Minutes- OT  0 minute(s)  -AN      OT Received On  03/19/19  -AN        User Key  (r) = Recorded By, (t) = Taken By, (c) = Cosigned By    Initials Name Provider Type    Raisa Rivera OT Occupational Therapist        Therapy Suggested Charges     Code   Minutes Charges    None           Therapy Charges for Today     Code Description Service Date Service Provider Modifiers Qty    [id number]  OT EVAL LOW COMPLEXITY 3 3/19/2019 Raisa Hernandez OT GO 1               OT Discharge Summary  Anticipated Discharge Disposition (OT): home  Reason for Discharge: At baseline function  Outcomes Achieved: Refer to plan of  care for updates on goals achieved  Discharge Destination: Home    Raisa Hernandez, OT  3/19/2019

## 2019-03-20 VITALS
OXYGEN SATURATION: 94 % | BODY MASS INDEX: 27.99 KG/M2 | RESPIRATION RATE: 18 BRPM | HEART RATE: 77 BPM | WEIGHT: 167.99 LBS | SYSTOLIC BLOOD PRESSURE: 144 MMHG | DIASTOLIC BLOOD PRESSURE: 73 MMHG | HEIGHT: 65 IN | TEMPERATURE: 97.3 F

## 2019-03-20 PROCEDURE — 99213 OFFICE O/P EST LOW 20 MIN: CPT | Performed by: PSYCHIATRY & NEUROLOGY

## 2019-03-20 PROCEDURE — 99217 PR OBSERVATION CARE DISCHARGE MANAGEMENT: CPT | Performed by: NURSE PRACTITIONER

## 2019-03-20 PROCEDURE — G0378 HOSPITAL OBSERVATION PER HR: HCPCS

## 2019-03-20 RX ADMIN — SODIUM CHLORIDE, PRESERVATIVE FREE 3 ML: 5 INJECTION INTRAVENOUS at 08:08

## 2019-03-20 RX ADMIN — ASPIRIN 81 MG 81 MG: 81 TABLET ORAL at 08:08

## 2019-03-20 NOTE — PLAN OF CARE
Problem: Patient Care Overview  Goal: Plan of Care Review  Outcome: Ongoing (interventions implemented as appropriate)   03/20/19 0656   Coping/Psychosocial   Plan of Care Reviewed With patient   Plan of Care Review   Progress improving   OTHER   Outcome Summary PATIENT ALERT AND ORIENTED. ON RA THIS SHIFT. VSS. UP AD GERTRUDIS. NO COMPLAINTS VOICED. WILL CONTINUE TO MONITOR.

## 2019-03-20 NOTE — PROGRESS NOTES
Case Management Discharge Note    Final Note: Spoke with patient in room.  Her plan is still to return home at discharge.  She denies any needs.  Aury Torres RN [phone]    Destination      No service has been selected for the patient.      Durable Medical Equipment      No service has been selected for the patient.      Dialysis/Infusion      No service has been selected for the patient.      Home Medical Care      No service has been selected for the patient.      Community Resources      No service has been selected for the patient.             Final Discharge Disposition Code: 01 - home or self-care

## 2019-03-20 NOTE — DISCHARGE SUMMARY
[facility] Medicine Services  DISCHARGE SUMMARY    Patient Name: Alexandrea Wiggins  : 1955  MRN: [id number]    Date of Admission: 3/18/2019  Date of Discharge:  3/20/19  Primary Care Physician: [place] Dickerson MD    Consults     Date and Time Order Name Status Description    3/19/2019 0030 Inpatient Neurology Consult Stroke Completed           Hospital Course     Presenting Problem:   Acute encephalopathy [G93.40]    Active Hospital Problems    Diagnosis  POA   • **Acute encephalopathy [G93.40]  Yes   • Nausea & vomiting [R11.2]  Yes      Resolved Hospital Problems   No resolved problems to display.          Hospital Course:  Alexandrea Wiggins is a 63 y.o. female with essentially no past medical history.  She awakened her  5 AM today to tell him she had been up during the night with nausea and vomiting and that she had vomited 5 times.  Prior to his departure for work he did notice that the trash can was placed by her side of the bed.  At approximately 645 the patient called her son-in-law to let him know that she had been up sick during the night and she would not be able to take the grandchildren to school.  She had told her son-in-law she was going to go back to bed and take a nap.  At approximately 745 she called her son-in-law again and stated somehow she had overslept and was afraid the children were going to be late to school.  She did not remember the previous conversation and did not remember telling him she had been up sick through the night.  Approximately 15 minutes later she called again stating that she was not able to  the grandchildren and was worried they would be late for school, once again forgetting the previous 2 conversations.  At approximately 830 the patient's  came home and he to found that she did not recall the events of the night or previous telephone conversations.  The family took her to the local emergency department which [facility]. Her to CT of the head was read as nonacute.  UDS and UA were both negative    Patient was transferred to [facility] ED for higher level of care and she was admitted to hospital medicine for further evaluation. Neurology was consulted. MRI brain, MR angiogram of brain and neck and ECHO were negative. EEG was normal and no epileptiform activity was seen . Neurology felt symptoms were related to transient global amnesia due to sudden memory loss and inability to recall events. Patient has mental status  has returned to baseline and she has remained clinically stable. She will be discharged home today. She will need to follow up with PCP in one week.       Discharge Follow Up Recommendations for labs/diagnostics:      Day of Discharge     HPI:   Patient is sitting up in bed in NAD. She feels good and has not had any other neurological issues. Able to answer questions appropriately and tell me what happened the day before. Patient is agreeable to discharge home.     Review of Systems  Gen- No fevers, chills  CV- No chest pain, palpitations  Resp- No cough, dyspnea  GI- No N/V/D, abd pain      Otherwise ROS is negative except as mentioned in the HPI.    Vital Signs:   Temp:  [97.3 °F (36.3 °C)-98.7 °F (37.1 °C)] 97.3 °F (36.3 °C)  Heart Rate:  [68-78] 77  Resp:  [16-18] 18  BP: (126-144)/(63-75) 144/73     Physical Exam:  Constitutional: No acute distress, awake, alert  HENT: NCAT, mucous membranes moist  Respiratory: Clear to auscultation bilaterally, respiratory effort normal, room air   Cardiovascular: RRR, no murmurs, rubs, or gallops, palpable pedal pulses bilaterally  Gastrointestinal: Positive bowel sounds, soft, nontender, nondistended  Musculoskeletal: No bilateral ankle edema  Psychiatric: Appropriate affect, cooperative  Neurologic: Oriented x 3, strength symmetric in all extremities, Cranial Nerves grossly intact to confrontation, speech clear  Skin: No rashes      Pertinent  and/or Most  Recent Results     Results from last 7 days   Lab Units 03/19/19  0503   SODIUM mmol/L 139   POTASSIUM mmol/L 4.4   CHLORIDE mmol/L 107   CO2 mmol/L 27.0   BUN mg/dL 13   CREATININE mg/dL 0.77   GLUCOSE mg/dL 96   CALCIUM mg/dL 8.8           Invalid input(s): PROT, LABALBU  Results from last 7 days   Lab Units 03/19/19  0503   CHOLESTEROL mg/dL 190   TRIGLYCERIDES mg/dL 62   HDL CHOL mg/dL 79*     Results from last 7 days   Lab Units 03/19/19  0503   HEMOGLOBIN A1C % 5.70*       Brief Urine Lab Results     None          Microbiology Results Abnormal     None          Imaging Results (all)     Procedure Component Value Units Date/Time    MRI Angiogram Head Without Contrast [[id number]] Collected:  03/18/19 2229     Updated:  03/18/19 2336    Narrative:       EXAM:    MR Angiogram Head Without Contrast, Arteries     EXAM DATE/TIME:    3/18/2019 10:29 PM     CLINICAL HISTORY:    63 years old, female; Signs and symptoms; Other: Stroke; Patient HX: Acute   encephalopathy memory loss evaluate for CVA; Additional info: Stroke. Acute   encephalopathy memory loss evaluate for CVA     TECHNIQUE:    MR angiogram head without contrast. Exam focused on the arteries.     COMPARISON:    No relevant prior studies available.     FINDINGS:    Right internal carotid artery: Unremarkable. Intracranial segment is patent   with no significant stenosis. No aneurysm.    Right anterior cerebral artery: Unremarkable. No occlusion or significant   stenosis. No aneurysm.    Right middle cerebral artery: Unremarkable. No occlusion or significant   stenosis. No aneurysm.    Right posterior cerebral artery: Unremarkable. No occlusion or significant   stenosis. No aneurysm.    Right vertebral artery: Unremarkable. No occlusion or significant stenosis. No   aneurysm.      Left internal carotid artery: Unremarkable. Intracranial segment is patent   with no significant stenosis. No aneurysm.    Left anterior cerebral artery: Unremarkable. No occlusion  or significant   stenosis. No aneurysm.    Left middle cerebral artery: Unremarkable. No occlusion or significant   stenosis. No aneurysm.    Left posterior cerebral artery: Unremarkable. No occlusion or significant   stenosis. No aneurysm.    Left vertebral artery:  The distal intracranial left vertebral artery is   hypoplastic but patent.    Basilar artery: Unremarkable. No occlusion or significant stenosis. No   aneurysm.       Impression:       No significant intracranial stenosis or evidence of large vessel occlusion.    THIS DOCUMENT HAS BEEN ELECTRONICALLY SIGNED BY DULCE SÁNCHEZ MD    MRI Angiogram Neck Without Contrast [[id number]] Collected:  03/18/19 2232     Updated:  03/18/19 2333    Narrative:       EXAM:    MR Angiography Neck Without Contrast     EXAM DATE/TIME:    3/18/2019 10:32 PM     CLINICAL HISTORY:    63 years old, female; Signs and symptoms; Other: Stroke; Patient HX: Acute   encephalopathy memory loss evaluate for CVA; Additional info: Stroke. Acute   encephalopathy memory loss evaluate for CVA     TECHNIQUE:    Magnetic resonance angiography images of the neck without intravenous   contrast.     COMPARISON:    MRI ANGIOGRAM HEAD WO CONTRAST 3/18/2019 10:09 PM     FINDINGS:    Right common carotid artery: Normal. No significant stenosis. No dissection or   occlusion.    Right internal carotid artery: Normal. Extracranial segment is patent with no   significant stenosis. No dissection or occlusion.    Right external carotid artery:  Mild stenosis at the origin of the right   external carotid artery.    Right vertebral artery: Normal. No significant stenosis. No dissection or   occlusion.    Left common carotid artery: Normal. No significant stenosis. No dissection or   occlusion.    Left internal carotid artery: Normal. Extracranial segment is patent with no   significant stenosis. No dissection or occlusion.    Left external carotid artery:  Mild stenosis at the origin of the left   external  carotid artery.    Left vertebral artery:  No significant stenosis. No dissection or occlusion.   Decreased signal of the intracranial segment is likely artifactual as the   vessel appears patent on the MRA of the head        Impression:       No hemodynamically significant stenosis of the internal carotid arteries by   NASCET criteria.    COMMENT:    Reference per NASCET criteria for degree of stenosis: Mild: less than 50%   stenosis. Moderate: 50-69% stenosis. Severe: 70-94% stenosis. Near occlusion:   95-99% stenosis.     THIS DOCUMENT HAS BEEN ELECTRONICALLY SIGNED BY DULCE SÁNCHEZ MD    MRI Brain Without Contrast [[id number]] Collected:  03/18/19 2232     Updated:  03/18/19 2324    Narrative:       EXAM:    MR Head Without Contrast     EXAM DATE/TIME:    3/18/2019 10:32 PM     CLINICAL HISTORY:    63 years old, female; Signs and symptoms; Other: Stroke; Patient HX: Acute   encephalopathy memory loss evaluate for CVA; Additional info: Stroke. Acute   encephalopathy memory loss evaluate for CVA     TECHNIQUE:    MR of the head without contrast.     COMPARISON:    None     FINDINGS:    Brain:  No acute infarction. No evidence of hemorrhage, mass effect, or edema.   There are minimal periventricular and subcortical areas of T2/flair high signal   consistent with chronic small vessel ischemic disease.    Ventricles: Normal. No ventriculomegaly.    Bones/joints: Unremarkable.    Soft tissues: Normal.    Sinuses: Normal as visualized. No acute sinusitis.    Mastoid air cells: Normal as visualized. No mastoid effusion.    Orbits: Unremarkable.         Impression:       No acute infarction    Chronic small vessel ischemic disease    THIS DOCUMENT HAS BEEN ELECTRONICALLY SIGNED BY DULCE SÁNCHEZ MD                    Results for orders placed during the hospital encounter of 03/18/19   Adult Transthoracic Echo Complete W/ Cont if Necessary Per Protocol (With Agitated Saline)    Narrative · Estimated EF = 60%.  · Left  ventricular systolic function is normal.  · Trace mitral and tricuspid regurgitation  · Trace to mild aortic insufficiency  · No cardiac source for embolus is seen            Discharge Details        Discharge Medications      Continue These Medications      Instructions Start Date   aspirin 81 MG chewable tablet   81 mg, Oral, Daily             No Known Allergies      Discharge Disposition:  Home or Self Care    Discharge Diet:  Diet Order   Procedures   • Diet Regular         Discharge Activity:   Activity Instructions     Activity as Tolerated              CODE STATUS:    Code Status and Medical Interventions:   Ordered at: 03/18/19 4590     Code Status:    CPR     Medical Interventions (Level of Support Prior to Arrest):    Full         No future appointments.    Additional Instructions for the Follow-ups that You Need to Schedule     Discharge Follow-up with PCP   As directed       Currently Documented PCP:    [place] Dickerson MD    PCP Phone Number:    [phone]     Follow Up Details:  pcp one week               Time Spent on Discharge:  35 minutes    Electronically signed by NIRAV Locke, 03/20/19, 10:31 AM.

## 2019-03-20 NOTE — PROGRESS NOTES
Daily Progress Note  Neurology     LOS: 0 days     Subjective     Chief Complaint: Sudden onset of confusion and memory loss.    Interval History: No acute issues overnight.  She feels she is back to baseline.  No problems with memory.    ROS: Negative for fever, chest pain or shortness of breath.    Objective     Vital signs in last 24 hours:  Temp:  [97.3 °F (36.3 °C)-98.7 °F (37.1 °C)] 97.3 °F (36.3 °C)  Heart Rate:  [68-78] 77  Resp:  [16-18] 18  BP: (126-144)/(63-75) 144/73      Physical Exam:   General: Lying in bed with eyes open. In NAD.     Respiratory: Respirations unlabored   CV: RRR       Neurologic Exam:   Mental status: Awake, alert, Follows commands. Speech fluent   CN: II-XII intact to detailed exam    Motor: Strength full (5/5) throughout in BUE and BLE    Reflexes: 2+ and symmetric throughout          Results Review:        Results from last 7 days   Lab Units 03/19/19  0503   SODIUM mmol/L 139   POTASSIUM mmol/L 4.4   CHLORIDE mmol/L 107   CO2 mmol/L 27.0   BUN mg/dL 13   CREATININE mg/dL 0.77   CALCIUM mg/dL 8.8       Mri Angiogram Head Without Contrast    Result Date: 3/18/2019  No significant intracranial stenosis or evidence of large vessel occlusion. THIS DOCUMENT HAS BEEN ELECTRONICALLY SIGNED BY DULCE SÁNCHEZ MD    Mri Angiogram Neck Without Contrast    Result Date: 3/18/2019  No hemodynamically significant stenosis of the internal carotid arteries by NASCET criteria. COMMENT:  Reference per NASCET criteria for degree of stenosis: Mild: less than 50% stenosis. Moderate: 50-69% stenosis. Severe: 70-94% stenosis. Near occlusion: 95-99% stenosis. THIS DOCUMENT HAS BEEN ELECTRONICALLY SIGNED BY DULCE SÁNCHEZ MD    Mri Brain Without Contrast    Result Date: 3/18/2019  No acute infarction Chronic small vessel ischemic disease THIS DOCUMENT HAS BEEN ELECTRONICALLY SIGNED BY DULCE SÁNCHEZ MD    I  personally reviewed labs.    EEG was reviewed as well.  No evidence of electrographic seizures or epileptiform abnormalities.      Assessment/Plan     Transient global amnesia: Doing much better since admission.  Reports that she is back to her baseline.  No problems with memory anymore.  Detailed workup including MRI brain, MRA brain and neck as well as 2D echocardiogram were unremarkable.  EEG was reviewed.  There is no evidence of electrographic seizures or epileptiform abnormalities.  Okay to discharge from neurology standpoint whenever okay with primary team.    Geovanny Fernandez MD  03/20/19  10:31 AM

## 2020-03-03 ENCOUNTER — TRANSCRIBE ORDERS (OUTPATIENT)
Dept: ADMINISTRATIVE | Facility: HOSPITAL | Age: 65
End: 2020-03-03

## 2020-03-03 DIAGNOSIS — Z12.31 VISIT FOR SCREENING MAMMOGRAM: Primary | ICD-10-CM

## 2020-04-28 ENCOUNTER — APPOINTMENT (OUTPATIENT)
Dept: MAMMOGRAPHY | Facility: HOSPITAL | Age: 65
End: 2020-04-28

## 2020-06-23 ENCOUNTER — HOSPITAL ENCOUNTER (OUTPATIENT)
Dept: MAMMOGRAPHY | Facility: HOSPITAL | Age: 65
Discharge: HOME | End: 2020-06-23
Admitting: OBSTETRICS & GYNECOLOGY

## 2020-06-23 DIAGNOSIS — Z12.31 VISIT FOR SCREENING MAMMOGRAM: ICD-10-CM

## 2020-06-23 PROCEDURE — 77067 SCR MAMMO BI INCL CAD: CPT | Performed by: RADIOLOGY

## 2020-06-23 PROCEDURE — 77063 BREAST TOMOSYNTHESIS BI: CPT | Performed by: RADIOLOGY

## 2020-06-23 PROCEDURE — 77067 SCR MAMMO BI INCL CAD: CPT

## 2020-06-23 PROCEDURE — 77063 BREAST TOMOSYNTHESIS BI: CPT

## 2021-05-17 ENCOUNTER — TRANSCRIBE ORDERS (OUTPATIENT)
Dept: ADMINISTRATIVE | Facility: HOSPITAL | Age: 66
End: 2021-05-17

## 2021-05-17 DIAGNOSIS — Z12.31 VISIT FOR SCREENING MAMMOGRAM: Primary | ICD-10-CM

## 2021-06-03 ENCOUNTER — OFFICE VISIT (OUTPATIENT)
Dept: OBSTETRICS AND GYNECOLOGY | Facility: CLINIC | Age: 66
End: 2021-06-03

## 2021-06-03 VITALS
WEIGHT: 193.2 LBS | SYSTOLIC BLOOD PRESSURE: 125 MMHG | DIASTOLIC BLOOD PRESSURE: 80 MMHG | BODY MASS INDEX: 35.55 KG/M2 | HEIGHT: 62 IN

## 2021-06-03 DIAGNOSIS — Z12.39 ENCOUNTER FOR BREAST CANCER SCREENING USING NON-MAMMOGRAM MODALITY: ICD-10-CM

## 2021-06-03 DIAGNOSIS — Z78.0 POSTMENOPAUSAL STATUS: ICD-10-CM

## 2021-06-03 DIAGNOSIS — D17.22 LIPOMA OF LEFT SHOULDER: ICD-10-CM

## 2021-06-03 DIAGNOSIS — Z80.3 FAMILY HISTORY OF BREAST CANCER: ICD-10-CM

## 2021-06-03 DIAGNOSIS — Z01.419 ENCOUNTER FOR ANNUAL ROUTINE GYNECOLOGICAL EXAMINATION: Primary | ICD-10-CM

## 2021-06-03 PROCEDURE — 99397 PER PM REEVAL EST PAT 65+ YR: CPT | Performed by: OBSTETRICS & GYNECOLOGY

## 2021-06-03 NOTE — PROGRESS NOTES
GYN Annual Exam     CC - Here for annual exam.     Subjective   HPI  Alexandrea Wiggins is a 65 y.o. female, , who presents for annual well woman exam.  She is postmenopausal. Her last LMP was No LMP recorded. Patient is postmenopausal..    Patient reports problems with: none.  Partner Status: Marital Status: .  New Partners since last visit: no Desires STD Screening: no    Patient reports that she is not currently experiencing any symptoms of urinary incontinence.        in .  She is grieving appropriately    Last mammogram:   Last Completed Mammogram          Scheduled - MAMMOGRAM (Every 2 Years) Scheduled for 2020  Mammo Screening Digital Tomosynthesis Bilateral With CAD    2019  Mammo Screening Digital Tomosynthesis Bilateral With CAD    2017  Mammo diagnostic digital tomosynthesis bilateral w CAD    2016  Mammo diagnostic right w CAD    2015  MAMMOGRAPHY DIAGNOSTIC BILATERAL    Only the first 5 history entries have been loaded, but more history exists.              Last colonoscopy:   Last Completed Colonoscopy     This patient has no relevant Health Maintenance data.        Last DEXA: unsure when  Last Pap :   Last Completed Pap Smear     This patient has no relevant Health Maintenance data.        History of abnormal Pap smear: no    Additional OB/GYN History   Current contraception: contraceptive methods: Post menopausal status  Desires to: do not start contraception  Family history of uterine, colon, breast, or ovarian cancer: yes - sister- breast cancer  Performs monthly Self-Breast Exam: no  Parental Hip Fracture: no  Exercises Regularly: yes  Feelings of Anxiety or Depression: no    Tobacco Usage?: No   OB History        2    Para   2    Term   2            AB        Living           SAB        TAB        Ectopic        Molar        Multiple        Live Births                    Health Maintenance   Topic Date  "Due   • DXA SCAN  Never done   • ANNUAL PHYSICAL  Never done   • COVID-19 Vaccine (1) Never done   • TDAP/TD VACCINES (1 - Tdap) Never done   • ZOSTER VACCINE (1 of 2) Never done   • Pneumococcal Vaccine 65+ (1 of 1 - PPSV23) Never done   • HEPATITIS C SCREENING  Never done   • INFLUENZA VACCINE  08/01/2021   • COLORECTAL CANCER SCREENING  06/01/2022   • MAMMOGRAM  06/23/2022       The additional following portions of the patient's history were reviewed and updated as appropriate: allergies, current medications, past family history, past medical history, past social history, past surgical history and problem list.    Review of Systems   Constitutional: Negative.    HENT: Negative.    Eyes: Negative.    Respiratory: Negative.    Cardiovascular: Negative.    Gastrointestinal: Negative.    Endocrine: Negative.    Genitourinary: Negative.    Musculoskeletal: Negative.    Skin: Negative.    Allergic/Immunologic: Negative.    Neurological: Negative.    Hematological: Negative.    Psychiatric/Behavioral: Negative.        I have reviewed and agree with the HPI, ROS, and historical information as entered above. Juliane Ham MD    Objective   /80   Ht 157.5 cm (62\")   Wt 87.6 kg (193 lb 3.2 oz)   BMI 35.34 kg/m²     Physical Exam  Vitals and nursing note reviewed. Exam conducted with a chaperone present.   Constitutional:       Appearance: She is well-developed.   HENT:      Head: Normocephalic and atraumatic.   Neck:      Thyroid: No thyroid mass or thyromegaly.   Cardiovascular:      Rate and Rhythm: Normal rate and regular rhythm.      Heart sounds: No murmur heard.     Pulmonary:      Effort: Pulmonary effort is normal. No retractions.      Breath sounds: Normal breath sounds. No wheezing, rhonchi or rales.   Chest:      Chest wall: No mass or tenderness.      Breasts:         Right: Normal. No mass, nipple discharge, skin change or tenderness.         Left: Normal. No mass, nipple discharge, skin change or " tenderness.   Abdominal:      General: Bowel sounds are normal.      Palpations: Abdomen is soft. Abdomen is not rigid. There is no mass.      Tenderness: There is no abdominal tenderness. There is no guarding.      Hernia: No hernia is present. There is no hernia in the left inguinal area.   Genitourinary:     Labia:         Right: No rash, tenderness or lesion.         Left: No rash, tenderness or lesion.       Vagina: Normal. No vaginal discharge or lesions.      Cervix: No cervical motion tenderness, discharge, lesion or cervical bleeding.      Uterus: Normal. Not enlarged, not fixed and not tender.       Adnexa:         Right: No mass or tenderness.          Left: No mass or tenderness.        Rectum: No external hemorrhoid.   Musculoskeletal:      Cervical back: Normal range of motion. No muscular tenderness.   Neurological:      Mental Status: She is alert and oriented to person, place, and time.   Psychiatric:         Behavior: Behavior normal.           Assessment/Plan     Encounter Diagnoses   Name Primary?   • Encounter for annual routine gynecological examination Yes   • Lipoma of left shoulder    • Encounter for breast cancer screening using non-mammogram modality    • Postmenopausal status    • Family history of breast cancer        Recommended use of Vitamin D replacement and getting adequate calcium in her diet. (1500mg)  Reviewed monthly self breast exams.  Instructed to call with lumps, pain, or breast discharge.    Continue yearly mammography  Reviewed HPV guidelines.  Reviewed exercise as a preventative health measures.    Shoulder lipoma that is growing with restricted range of motion- will refer for further evaluation.    Juliane Ham MD   06/03/2021

## 2021-06-10 DIAGNOSIS — Z01.419 ENCOUNTER FOR ANNUAL ROUTINE GYNECOLOGICAL EXAMINATION: ICD-10-CM

## 2021-07-01 ENCOUNTER — HOSPITAL ENCOUNTER (OUTPATIENT)
Dept: MAMMOGRAPHY | Facility: HOSPITAL | Age: 66
Discharge: HOME | End: 2021-07-01
Admitting: OBSTETRICS & GYNECOLOGY

## 2021-07-01 DIAGNOSIS — Z12.31 VISIT FOR SCREENING MAMMOGRAM: ICD-10-CM

## 2021-07-01 PROCEDURE — 77063 BREAST TOMOSYNTHESIS BI: CPT

## 2021-07-01 PROCEDURE — 77067 SCR MAMMO BI INCL CAD: CPT

## 2021-07-01 PROCEDURE — 77063 BREAST TOMOSYNTHESIS BI: CPT | Performed by: RADIOLOGY

## 2021-07-01 PROCEDURE — 77067 SCR MAMMO BI INCL CAD: CPT | Performed by: RADIOLOGY

## 2022-05-04 NOTE — PROGRESS NOTES
Discharge Planning Assessment  Psychiatric     Patient Name: Alexandrea Wiggins  MRN: [id number]  Today's Date: 3/19/2019    Admit Date: 3/18/2019    Discharge Needs Assessment     Row Name 03/19/19 0838       Living Environment    Lives With  spouse    Current Living Arrangements  home/apartment/condo    Primary Care Provided by  self    Provides Primary Care For  no one    Family Caregiver if Needed  spouse    Quality of Family Relationships  involved;supportive    Able to Return to Prior Arrangements  yes       Resource/Environmental Concerns    Resource/Environmental Concerns  none       Transition Planning    Patient/Family Anticipates Transition to  home with family    Patient/Family Anticipated Services at Transition  ;rehabilitation services    Transportation Anticipated  family or friend will provide       Discharge Needs Assessment    Readmission Within the Last 30 Days  no previous admission in last 30 days    Concerns to be Addressed  discharge planning    Equipment Currently Used at Home  none    Anticipated Changes Related to Illness  none    Equipment Needed After Discharge  none        Discharge Plan     Row Name 03/19/19 0839       Plan    Plan  Home    Patient/Family in Agreement with Plan  yes    Plan Comments  Spoke with patient in room to initiate discharge planning.  She lives with her  in [facility].  She is independent with ADL's.  She has no DME and has never had home health.  Ms. Wiggins has RX coverage and has her scripts filled at [facility].  Her goal is to return home with her  at discharge.  Will await therapy recommendations to determine proper discharge placement.  CM will continue to follow.  Aury Torres RN [phone]    Final Discharge Disposition Code  01 - home or self-care        Destination      No service coordination in this encounter.      Durable Medical Equipment      No service coordination in this encounter.      Dialysis/Infusion       No service coordination in this encounter.      Home Medical Care      No service coordination in this encounter.      Community Resources      No service coordination in this encounter.        Expected Discharge Date and Time     Expected Discharge Date Expected Discharge Time    Mar 21, 2019         Demographic Summary     Row Name 03/19/19 0838       General Information    Admission Type  observation    Arrived From  hospital    Referral Source  admission list    Reason for Consult  discharge planning    Preferred Language  English     Used During This Interaction  no    General Information Comments  PCP- [place]        Functional Status     Row Name 03/19/19 0838       Functional Status    Usual Activity Tolerance  good    Current Activity Tolerance  good       Functional Status, IADL    Medications  independent    Meal Preparation  independent    Housekeeping  independent    Laundry  independent    Shopping  independent        Psychosocial    No documentation.       Abuse/Neglect    No documentation.       Legal     Row Name 03/19/19 0838       Financial/Legal    Finance Comments  Verified with patient that she has [place].  No issues obtaining medications.        Substance Abuse    No documentation.       Patient Forms    No documentation.           Aury Torres RN     I, Rachael Beaz, performed the initial face to face bedside interview with this patient regarding history of present illness, review of symptoms and relevant past medical, social and family history.  I completed an independent physical examination.  I was the initial provider who evaluated this patient. I have signed out the follow up of any pending tests (i.e. labs, radiological studies) to the ACP.  I have communicated the patient’s plan of care and disposition with the ACP.  The history, relevant review of systems, past medical and surgical history, medical decision making, and physical examination was documented by the scribe in my presence and I attest to the accuracy of the documentation.

## 2022-05-09 NOTE — THERAPY DISCHARGE NOTE
Acute Care - Physical Therapy Initial Eval/Discharge  [facility]     Patient Name: Alexandrea Wiggins  : 1955  MRN: [id number]  Today's Date: 3/19/2019   Onset of Illness/Injury or Date of Surgery: 19  Date of Referral to PT: 19  Referring Physician: MD Ignacio      Admit Date: 3/18/2019    Visit Dx:    ICD-10-CM ICD-9-CM   1. Impaired functional mobility, balance, gait, and endurance Z74.09 V49.89   2. Impaired mobility and ADLs Z74.09 799.89     Patient Active Problem List   Diagnosis   • Acute encephalopathy   • Nausea & vomiting     History reviewed. No pertinent past medical history.  Past Surgical History:   Procedure Laterality Date   • REDUCTION MAMMAPLASTY            PT ASSESSMENT (last 12 hours)      Physical Therapy Evaluation     Row Name 19 0906          PT Evaluation Time/Intention    Subjective Information  no complaints COMPLETED CHART REVIEW [id number].   -CD     Document Type  discharge evaluation/summary  -CD     Mode of Treatment  physical therapy  -CD     Row Name 19 0906          General Information    Patient Profile Reviewed?  yes  -CD     Onset of Illness/Injury or Date of Surgery  19  -CD     Referring Physician  MD IGNACIO  -CD     Patient Observations  alert;cooperative;agree to therapy  -CD     Patient/Family Observations  PT SITTING UP IN BED WITH DTR AND SISTER PRESENT. PT ON RA.   -CD     Prior Level of Function  independent:;all household mobility;community mobility;ADL's;home management;driving;using stairs;work  -CD     Equipment Currently Used at Home  none  -CD     Pertinent History of Current Functional Problem  PT TO ED WITH CONFUSION AND IMPAIRED STM FOLLOWING EPISODE OF N&V. IMAGING NEG THUS FAR. UDS/ UA NEG.   -CD     Existing Precautions/Restrictions  no known precautions/restrictions  -CD     Risks Reviewed  nausea/vomiting;dizziness;change in vital signs  -CD     Benefits Reviewed  patient:;increase knowledge  -CD     Barriers to Rehab   none identified  -CD     Row Name 03/19/19 0906          Relationship/Environment    Lives With  spouse  -CD     Row Name 03/19/19 0906          Resource/Environmental Concerns    Current Living Arrangements  home/apartment/condo  -CD     Row Name 03/19/19 0906          Home Main Entrance    Number of Stairs, Main Entrance  three  -CD     Stair Railings, Main Entrance  railings on both sides of stairs  -CD     Row Name 03/19/19 0906          Stairs Within Home, Primary    Stairs, Within Home, Primary  15  -CD     Stair Railings, Within Home, Primary  railing on right side (ascending)  -CD     Row Name 03/19/19 0906          Cognitive Assessment/Intervention- PT/OT    Orientation Status (Cognition)  oriented x 3  -CD     Follows Commands (Cognition)  WNL  -CD     Row Name 03/19/19 0906          Bed Mobility Assessment/Treatment    Bed Mobility Assessment/Treatment  bed mobility (all) activities  -CD     [place] Level (Bed Mobility)  independent  -CD     Row Name 03/19/19 0906          Transfer Assessment/Treatment    Transfer Assessment/Treatment  sit-stand transfer;stand-sit transfer  -CD     Comment (Transfers)  INDEPENDENT.   -CD     Row Name 03/19/19 0906          Gait/Stairs Assessment/Training    Gait/Stairs Assessment/Training  gait/ambulation independence  -CD     [place] Level (Gait)  independent  -CD     Assistive Device (Gait)  -- GAIT BELT FOR EVAL SAFETY.   -CD     Distance in Feet (Gait)  500  -CD     Row Name 03/19/19 0906          General ROM    GENERAL ROM COMMENTS  B LE AROM WFL'S   -CD     Row Name 03/19/19 0906          MMT (Manual Muscle Testing)    General MMT Comments  GROSSLY 5/5 AND SYMMETRICAL.   -CD     Row Name 03/19/19 0906          Sensory Assessment/Intervention    Sensory General Assessment  no sensation deficits identified B LE'S   -CD     Row Name 03/19/19 0906          Vision Assessment/Intervention    Vision Assessment Comment  READING SIGNS IN PATEL L/R DURING GAIT.   -CD      Row Name 03/19/19 0906          Pain Assessment    Additional Documentation  Pain Scale: Numbers Pre/Post-Treatment (Group)  -CD     Row Name 03/19/19 0906          Pain Scale: Numbers Pre/Post-Treatment    Pain Scale: Numbers, Pretreatment  0/10 - no pain  -CD     Pain Scale: Numbers, Post-Treatment  0/10 - no pain  -CD     Row Name 03/19/19 0906          Physical Therapy Clinical Impression    Date of Referral to PT  03/18/19  -CD     PT Diagnosis (PT Clinical Impression)  TIA VS TGA   -CD     Patient/Family Goals Statement (PT Clinical Impression)  TO GO HOME.   -CD     Criteria for Skilled Interventions Met (PT Clinical Impression)  no  -CD     Care Plan Review (PT)  evaluation/treatment results reviewed;care plan/treatment goals reviewed;risks/benefits reviewed;patient/other agree to care plan  -CD     Row Name 03/19/19 0906          Positioning and Restraints    Pre-Treatment Position  in bed  -CD     Post Treatment Position  chair  -CD     In Chair  with OT;with family/caregiver;sitting NSG NOTIFIED PT IS SAFE TO BE UP AD GERTRUDIS.   -CD     Row Name 03/19/19 0906          Physical Therapy Discharge Summary    Additional Documentation  Discharge Summary, PT Eval (Group)  -CD     Row Name 03/19/19 0906          Discharge Summary, PT Eval    Outcomes Achieved Upon Discharge (PT Discharge Summary)  evaluation only  -CD     Row Name 03/19/19 0906          Living Environment    Home Accessibility  tub/shower is not walk in;stairs within home;stairs to enter home  -CD       User Key  (r) = Recorded By, (t) = Taken By, (c) = Cosigned By    Initials Name Provider Type    CD Brandy Wolff, PT Physical Therapist              PT Recommendation and Plan  Anticipated Discharge Disposition (PT): home  Therapy Frequency (PT Clinical Impression): evaluation only  Outcome Summary/Treatment Plan (PT)  Anticipated Discharge Disposition (PT): home  Plan of Care Reviewed With: patient, daughter  Outcome Summary: GOALS NOT  ESTABLISHED AS PT IS AT BASELINE WITH FUNCTIONAL MOBILITY. NO DEFICITS IN STRENGTH, BALANCE, COORDINATION, OR FUNCTIONAL MOBILITY. D/C P.T. PT IS SAFE TO BE UP AD GERTRUDIS.     Outcome Measures     Row Name 03/19/19 0920 03/19/19 0906          How much help from another person do you currently need...    Turning from your back to your side while in flat bed without using bedrails?  --  4  -CD     Moving from lying on back to sitting on the side of a flat bed without bedrails?  --  4  -CD     Moving to and from a bed to a chair (including a wheelchair)?  --  4  -CD     Standing up from a chair using your arms (e.g., wheelchair, bedside chair)?  --  4  -CD     Climbing 3-5 steps with a railing?  --  4  -CD     To walk in hospital room?  --  4  -CD     -[facility] 6 Clicks Score  --  24  -CD        How much help from another is currently needed...    Putting on and taking off regular lower body clothing?  4  -AN  --     Bathing (including washing, rinsing, and drying)  4  -AN  --     Toileting (which includes using toilet bed pan or urinal)  4  -AN  --     Putting on and taking off regular upper body clothing  4  -AN  --     Taking care of personal grooming (such as brushing teeth)  4  -AN  --     Eating meals  4  -AN  --     Score  24  -AN  --        Modified [place] Scale    Pre-Stroke Modified [place] Scale  0 - No Symptoms at all.  -AN  0 - No Symptoms at all.  -CD     Modified [place] Scale  0 - No Symptoms at all.  -AN  0 - No Symptoms at all.  -CD        Functional Assessment    Outcome Measure Options  AM-PAC 6 Clicks Daily Activity (OT);Modified [place]  -AN  AM-[facility] 6 Clicks Basic Mobility (PT);Modified [place]  -CD       User Key  (r) = Recorded By, (t) = Taken By, (c) = Cosigned By    Initials Name Provider Type    CD Brandy Wolff, PT Physical Therapist    Raisa Rivera, OT Occupational Therapist           Time Calculation:   PT Charges     Row Name 03/19/19 0906             Time Calculation    Start Time  0906  -       PT Received On  03/19/19  -CD        User Key  (r) = Recorded By, (t) = Taken By, (c) = Cosigned By    Initials Name Provider Type    CD Brandy Wolff, PT Physical Therapist        Therapy Charges for Today     Code Description Service Date Service Provider Modifiers Qty    [id number]  PT EVAL LOW COMPLEXITY 4 3/19/2019 Brandy Wolff, PT GP 1          PT G-Codes  Outcome Measure Options: AM-PAC 6 Clicks Daily Activity (OT), Modified [place]  AM-PAC 6 Clicks Score: 24  Score: 24  Modified [place] Scale: 0 - No Symptoms at all.    PT Discharge Summary  Anticipated Discharge Disposition (PT): home  Reason for Discharge: At baseline function  Discharge Destination: Home    Brandy Wolff, PT  3/19/2019        Photo Preface (Leave Blank If You Do Not Want): Photographs were obtained today Detail Level: Zone

## 2022-05-26 ENCOUNTER — TRANSCRIBE ORDERS (OUTPATIENT)
Dept: ADMINISTRATIVE | Facility: HOSPITAL | Age: 67
End: 2022-05-26

## 2022-05-26 DIAGNOSIS — Z12.31 VISIT FOR SCREENING MAMMOGRAM: Primary | ICD-10-CM

## 2022-06-14 ENCOUNTER — OFFICE VISIT (OUTPATIENT)
Dept: OBSTETRICS AND GYNECOLOGY | Facility: CLINIC | Age: 67
End: 2022-06-14

## 2022-06-14 VITALS
HEIGHT: 62 IN | SYSTOLIC BLOOD PRESSURE: 142 MMHG | BODY MASS INDEX: 35.15 KG/M2 | DIASTOLIC BLOOD PRESSURE: 70 MMHG | WEIGHT: 191 LBS

## 2022-06-14 DIAGNOSIS — Z12.39 ENCOUNTER FOR BREAST CANCER SCREENING USING NON-MAMMOGRAM MODALITY: ICD-10-CM

## 2022-06-14 DIAGNOSIS — Z01.419 WOMEN'S ANNUAL ROUTINE GYNECOLOGICAL EXAMINATION: Primary | ICD-10-CM

## 2022-06-14 DIAGNOSIS — Z78.0 POST-MENOPAUSAL: ICD-10-CM

## 2022-06-14 DIAGNOSIS — R01.1 HEART MURMUR: ICD-10-CM

## 2022-06-14 DIAGNOSIS — Z80.3 FAMILY HISTORY OF BREAST CANCER: ICD-10-CM

## 2022-06-14 DIAGNOSIS — Z12.11 SCREENING FOR COLON CANCER: ICD-10-CM

## 2022-06-14 DIAGNOSIS — Z78.0 POSTMENOPAUSAL STATUS: ICD-10-CM

## 2022-06-14 PROCEDURE — 99397 PER PM REEVAL EST PAT 65+ YR: CPT | Performed by: OBSTETRICS & GYNECOLOGY

## 2022-06-14 PROCEDURE — 99213 OFFICE O/P EST LOW 20 MIN: CPT | Performed by: OBSTETRICS & GYNECOLOGY

## 2022-06-14 RX ORDER — MULTIVIT WITH MINERALS/LUTEIN
250 TABLET ORAL DAILY
COMMUNITY

## 2022-06-14 RX ORDER — NICOTINE POLACRILEX 2 MG
GUM BUCCAL
COMMUNITY
End: 2022-08-16

## 2022-06-14 RX ORDER — MULTIPLE VITAMINS W/ MINERALS TAB 9MG-400MCG
1 TAB ORAL DAILY
COMMUNITY
End: 2022-08-16

## 2022-06-14 NOTE — PROGRESS NOTES
GYN Annual Exam     CC - Here for annual exam.     Subjective   HPI  Alexandrea Wiggins is a 66 y.o. female, , who presents for annual well woman exam.  No LMP recorded. Patient is postmenopausal..    Patient reports problems with: none.  Partner Status: Marital Status: .  New Partners since last visit: no Desires STD Screening: no    Last mammogram: scheduled 2022  Last Completed Mammogram          Scheduled - MAMMOGRAM (Yearly) Scheduled for 2021  Mammo Screening Digital Tomosynthesis Bilateral With CAD    2020  Mammo Screening Digital Tomosynthesis Bilateral With CAD    2019  Mammo Screening Digital Tomosynthesis Bilateral With CAD    2017  Mammo diagnostic digital tomosynthesis bilateral w CAD    2016  Mammo diagnostic right w CAD    Only the first 5 history entries have been loaded, but more history exists.              Last colonoscopy: Cologard 2019  Last Completed Colonoscopy     This patient has no relevant Health Maintenance data.        Last DEXA: 1/10/2019 nl   Last Pap : 6/3/2021  Last Completed Pap Smear     This patient has no relevant Health Maintenance data.        History of abnormal Pap smear: no    Additional OB/GYN History   Current contraception: contraceptive methods: Post menopausal status  Desires to: do not start contraception  Family history of uterine, colon, breast, or ovarian cancer: yes - Breast CA-sister  Performs monthly Self-Breast Exam: occ  Parental Hip Fracture: no  Exercises Regularly: no  Feelings of Anxiety or Depression: no    Tobacco Usage?: No   OB History        2    Para   2    Term   2            AB        Living   2       SAB        IAB        Ectopic        Molar        Multiple        Live Births   2                Health Maintenance   Topic Date Due   • DXA SCAN  Never done   • ANNUAL PHYSICAL  Never done   • COVID-19 Vaccine (1) Never done   • TDAP/TD VACCINES (1 - Tdap) Never done   • ZOSTER  "VACCINE (1 of 2) Never done   • Pneumococcal Vaccine 65+ (1 - PCV) Never done   • HEPATITIS C SCREENING  Never done   • COLORECTAL CANCER SCREENING  06/01/2022   • PAP SMEAR  06/03/2022   • MAMMOGRAM  07/01/2022   • INFLUENZA VACCINE  10/01/2022       The additional following portions of the patient's history were reviewed and updated as appropriate: allergies, current medications, past family history, past medical history, past social history, past surgical history and problem list.    Review of Systems   Constitutional: Negative.    HENT: Negative.    Eyes: Negative.    Respiratory: Negative.    Cardiovascular: Negative.    Gastrointestinal: Negative.    Endocrine: Negative.    Genitourinary: Negative.    Musculoskeletal: Negative.    Skin: Negative.    Allergic/Immunologic: Negative.    Neurological: Negative.    Hematological: Negative.    Psychiatric/Behavioral: Negative.        I have reviewed and agree with the HPI, ROS, and historical information as entered above. Juliane Ham MD    Objective   /70   Ht 157.5 cm (62\")   Wt 86.6 kg (191 lb)   BMI 34.93 kg/m²     Physical Exam  Vitals and nursing note reviewed. Exam conducted with a chaperone present.   Constitutional:       Appearance: She is well-developed.   HENT:      Head: Normocephalic and atraumatic.   Neck:      Thyroid: No thyroid mass or thyromegaly.   Cardiovascular:      Rate and Rhythm: Normal rate and regular rhythm.      Heart sounds: Murmur heard.   Pulmonary:      Effort: Pulmonary effort is normal. No retractions.      Breath sounds: Normal breath sounds. No wheezing, rhonchi or rales.   Chest:      Chest wall: No mass or tenderness.   Breasts:      Right: Normal. No mass, nipple discharge, skin change or tenderness.      Left: Normal. No mass, nipple discharge, skin change or tenderness.       Abdominal:      General: Bowel sounds are normal.      Palpations: Abdomen is soft. Abdomen is not rigid. There is no mass.      " Tenderness: There is no abdominal tenderness. There is no guarding.      Hernia: No hernia is present. There is no hernia in the left inguinal area.   Genitourinary:     Labia:         Right: No rash, tenderness or lesion.         Left: No rash, tenderness or lesion.       Vagina: Normal. No vaginal discharge or lesions.      Cervix: No cervical motion tenderness, discharge, lesion or cervical bleeding.      Uterus: Normal. Not enlarged, not fixed and not tender.       Adnexa:         Right: No mass or tenderness.          Left: No mass or tenderness.        Rectum: No external hemorrhoid.   Musculoskeletal:      Cervical back: Normal range of motion. No muscular tenderness.   Neurological:      Mental Status: She is alert and oriented to person, place, and time.   Psychiatric:         Behavior: Behavior normal.           Assessment & Plan     Encounter Diagnoses   Name Primary?   • Women's annual routine gynecological examination Yes   • Screening for colon cancer    • Post-menopausal    • Encounter for breast cancer screening using non-mammogram modality    • Postmenopausal status    • Family history of breast cancer    • Heart murmur        Recommended use of Vitamin D replacement and getting adequate calcium in her diet. (1500mg)  BDS reviewed and FRAX scores do not necessitate further intervention at this time.   Reviewed monthly self breast exams.  Instructed to call with lumps, pain, or breast discharge.    Continue yearly mammography  Reviewed HPV guidelines.  Reviewed exercise as a preventative health measures.   New heart murmur - will refer to Cardiology  Cologard ordered.  Patient reports that she is not currently experiencing any symptoms of urinary incontinence.      Juliane Ham MD   06/14/2022

## 2022-07-05 ENCOUNTER — HOSPITAL ENCOUNTER (OUTPATIENT)
Dept: MAMMOGRAPHY | Facility: HOSPITAL | Age: 67
Discharge: HOME | End: 2022-07-05
Admitting: OBSTETRICS & GYNECOLOGY

## 2022-07-05 DIAGNOSIS — Z12.31 VISIT FOR SCREENING MAMMOGRAM: ICD-10-CM

## 2022-07-05 PROCEDURE — 77063 BREAST TOMOSYNTHESIS BI: CPT | Performed by: RADIOLOGY

## 2022-07-05 PROCEDURE — 77067 SCR MAMMO BI INCL CAD: CPT | Performed by: RADIOLOGY

## 2022-07-05 PROCEDURE — 77063 BREAST TOMOSYNTHESIS BI: CPT

## 2022-07-05 PROCEDURE — 77067 SCR MAMMO BI INCL CAD: CPT

## 2022-08-15 NOTE — PROGRESS NOTES
[facility] Cardiology  Follow Up Visit  Alexandrea Wiggins  1955    VISIT DATE:  22    PCP:   South Dickerson MD  [address] [address]          CC:  Heart Murmur      Problem List:  1.  Previous episode of transient global amnesia    Echo 2019:  · Estimated EF = 60%.  · Left ventricular systolic function is normal.  · Trace mitral and tricuspid regurgitation  · Trace to mild aortic insufficiency  · No cardiac source for embolus is seen         History of Present Illness:  Alexandrea Wiggins  Is a 66 y.o. female with pertinent cardiac history detailed above.  Patient referred by Dr. Ham for evaluation of a new heart murmur.  Patient has no cardiac symptoms currently no chest pain no dyspnea.  Was very mildly hypertensive in the office today systolic 142.  She did have an echo in 2019 showing trace to mild aortic insufficiency.  She had an EKG in the office today did not show any abnormalities.  No other new concerns from the patient today.  She states she has not had any lipids checked on a routine basis for some time.      Patient Active Problem List    Diagnosis Date Noted   • Acute encephalopathy 2019   • Nausea & vomiting 2019       No Known Allergies    Social History     Socioeconomic History   • Marital status:    Tobacco Use   • Smoking status: Former Smoker     Packs/day: 1.00     Types: Cigarettes     Quit date:      Years since quittin.6   • Smokeless tobacco: Never Used   Vaping Use   • Vaping Use: Never used   Substance and Sexual Activity   • Alcohol use: Yes     Comment: occas   • Drug use: No   • Sexual activity: Defer       Family History   Problem Relation Age of Onset   • Hypertension Mother    • Crohn's disease Mother    • Heart disease Father    • Hypertension Father    • Prostate cancer Father    • Breast cancer Sister 53   • Lung cancer Sister    • Ovarian cancer Neg Hx        Current Medications:    Current Outpatient  "Medications:   •  Biotin 5000 MCG capsule, Take 1 capsule by mouth Daily., Disp: , Rfl:   •  multivitamin with minerals (ALIVE WOMENS 50+ PO), Take 1 tablet by mouth Daily., Disp: , Rfl:   •  vitamin D3 125 MCG (5000 UT) capsule capsule, Take 5,000 Units by mouth Daily., Disp: , Rfl:   •  Zinc 50 MG tablet, Take 1 tablet by mouth Daily., Disp: , Rfl:   •  vitamin C (ASCORBIC ACID) 250 MG tablet, Take 250 mg by mouth Daily., Disp: , Rfl:      Review of Systems   Cardiovascular: Negative for chest pain, dyspnea on exertion, irregular heartbeat, leg swelling and palpitations.   Respiratory: Negative for cough and shortness of breath.        Vitals:    08/16/22 1010   BP: 142/74   BP Location: Left arm   Patient Position: Sitting   Pulse: 60   SpO2: 94%   Weight: 87.6 kg (193 lb 3.2 oz)   Height: 157.5 cm (62\")       Physical Exam  Constitutional:       Appearance: Normal appearance.   Neck:      Vascular: No carotid bruit.   Cardiovascular:      Rate and Rhythm: Normal rate and regular rhythm.      Heart sounds: Murmur (2/6 systolic murmur) heard.   Pulmonary:      Effort: Pulmonary effort is normal.      Breath sounds: Normal breath sounds.   Musculoskeletal:      Right lower leg: No edema.      Left lower leg: No edema.   Neurological:      General: No focal deficit present.      Mental Status: She is alert and oriented to person, place, and time.         Diagnostic Data:    ECG 12 Lead    Date/Time: 8/16/2022 10:41 AM  Performed by: Sim Connors MD  Authorized by: Sim Connors MD   Comparison: compared with previous ECG from 3/18/2019  Similar to previous ECG  Rhythm: sinus rhythm  Rate: normal  BPM: 60  QRS axis: normal    Clinical impression: normal ECG          Lab Results   Component Value Date    TRIG 62 03/19/2019    HDL 79 (H) 03/19/2019     Lab Results   Component Value Date    GLUCOSE 96 03/19/2019    BUN 13 03/19/2019    CREATININE 0.77 03/19/2019     03/19/2019    K 4.4 " 03/19/2019     03/19/2019    CO2 27.0 03/19/2019     Lab Results   Component Value Date    HGBA1C 5.70 (H) 03/19/2019     No results found for: WBC, HGB, HCT, PLT    Assessment:   Diagnosis Plan   1. Heart murmur  ECG 12 Lead    Lipid Panel    Adult Transthoracic Echo Complete W/ Cont if Necessary Per Protocol    Comprehensive Metabolic Panel       Plan:      1.  Heart murmur  -Murmur sounds like it is originating from her aortic valve  -Obtain updated echo to reevaluate  -Previous echo with trace to mild aortic insufficiency.    2.  Healthcare maintenance  -Encourage diet and lifestyle modifications including low-sodium diet for her mild hypertension  -Obtain baseline lipids and CMP    Will schedule one year follow-up and will update her after the echo is available for review.      Sim Connors MD [facility]

## 2022-08-16 ENCOUNTER — OFFICE VISIT (OUTPATIENT)
Dept: CARDIOLOGY | Facility: CLINIC | Age: 67
End: 2022-08-16

## 2022-08-16 VITALS
SYSTOLIC BLOOD PRESSURE: 142 MMHG | HEIGHT: 62 IN | OXYGEN SATURATION: 94 % | HEART RATE: 60 BPM | WEIGHT: 193.2 LBS | DIASTOLIC BLOOD PRESSURE: 74 MMHG | BODY MASS INDEX: 35.55 KG/M2

## 2022-08-16 DIAGNOSIS — R01.1 HEART MURMUR: Primary | ICD-10-CM

## 2022-08-16 PROCEDURE — 93000 ELECTROCARDIOGRAM COMPLETE: CPT | Performed by: INTERNAL MEDICINE

## 2022-08-16 PROCEDURE — 99204 OFFICE O/P NEW MOD 45 MIN: CPT | Performed by: INTERNAL MEDICINE

## 2022-08-16 RX ORDER — MULTIPLE VITAMINS W/ MINERALS TAB 9MG-400MCG
1 TAB ORAL DAILY
COMMUNITY

## 2022-08-16 RX ORDER — ZINC GLUCONATE 50 MG
1 TABLET ORAL DAILY
COMMUNITY

## 2022-09-01 ENCOUNTER — HOSPITAL ENCOUNTER (OUTPATIENT)
Dept: CARDIOLOGY | Facility: HOSPITAL | Age: 67
Discharge: HOME | End: 2022-09-01
Admitting: INTERNAL MEDICINE

## 2022-09-01 VITALS — WEIGHT: 193 LBS | HEIGHT: 62 IN | BODY MASS INDEX: 35.51 KG/M2

## 2022-09-01 DIAGNOSIS — R01.1 HEART MURMUR: ICD-10-CM

## 2022-09-01 LAB
BH CV ECHO MEAS - AI P1/2T: 519.7 MSEC
BH CV ECHO MEAS - AO MAX PG: 18.9 MMHG
BH CV ECHO MEAS - AO MEAN PG: 10.6 MMHG
BH CV ECHO MEAS - AO ROOT DIAM: 3.2 CM
BH CV ECHO MEAS - AO V2 MAX: 217 CM/SEC
BH CV ECHO MEAS - AO V2 VTI: 55 CM
BH CV ECHO MEAS - AVA(I,D): 1.05 CM2
BH CV ECHO MEAS - EDV(CUBED): 175.6 ML
BH CV ECHO MEAS - EDV(MOD-SP2): 118 ML
BH CV ECHO MEAS - EDV(MOD-SP4): 89.7 ML
BH CV ECHO MEAS - EF(MOD-BP): 56.2 %
BH CV ECHO MEAS - EF(MOD-SP2): 54.2 %
BH CV ECHO MEAS - EF(MOD-SP4): 57.9 %
BH CV ECHO MEAS - ESV(CUBED): 103.8 ML
BH CV ECHO MEAS - ESV(MOD-SP2): 54 ML
BH CV ECHO MEAS - ESV(MOD-SP4): 37.8 ML
BH CV ECHO MEAS - FS: 16.1 %
BH CV ECHO MEAS - IVS/LVPW: 0.89 CM
BH CV ECHO MEAS - IVSD: 0.8 CM
BH CV ECHO MEAS - LA DIMENSION: 3.3 CM
BH CV ECHO MEAS - LAT PEAK E' VEL: 10.1 CM/SEC
BH CV ECHO MEAS - LV DIASTOLIC VOL/BSA (35-75): 47.6 CM2
BH CV ECHO MEAS - LV MASS(C)D: 178.1 GRAMS
BH CV ECHO MEAS - LV MAX PG: 4.2 MMHG
BH CV ECHO MEAS - LV MEAN PG: 2 MMHG
BH CV ECHO MEAS - LV SYSTOLIC VOL/BSA (12-30): 20.1 CM2
BH CV ECHO MEAS - LV V1 MAX: 102 CM/SEC
BH CV ECHO MEAS - LV V1 VTI: 27 CM
BH CV ECHO MEAS - LVIDD: 5.6 CM
BH CV ECHO MEAS - LVIDS: 4.7 CM
BH CV ECHO MEAS - LVOT AREA: 2.14 CM2
BH CV ECHO MEAS - LVOT DIAM: 1.65 CM
BH CV ECHO MEAS - LVPWD: 0.9 CM
BH CV ECHO MEAS - MED PEAK E' VEL: 10.1 CM/SEC
BH CV ECHO MEAS - MV A MAX VEL: 77.1 CM/SEC
BH CV ECHO MEAS - MV DEC SLOPE: 582 CM/SEC2
BH CV ECHO MEAS - MV DEC TIME: 0.13 MSEC
BH CV ECHO MEAS - MV E MAX VEL: 105 CM/SEC
BH CV ECHO MEAS - MV E/A: 1.36
BH CV ECHO MEAS - MV MAX PG: 4.9 MMHG
BH CV ECHO MEAS - MV MEAN PG: 2 MMHG
BH CV ECHO MEAS - MV P1/2T: 52.8 MSEC
BH CV ECHO MEAS - MV V2 VTI: 31.2 CM
BH CV ECHO MEAS - MVA(P1/2T): 4.2 CM2
BH CV ECHO MEAS - MVA(VTI): 1.85 CM2
BH CV ECHO MEAS - PA ACC TIME: 0.17 SEC
BH CV ECHO MEAS - PA PR(ACCEL): 4.8 MMHG
BH CV ECHO MEAS - PA V2 MAX: 104 CM/SEC
BH CV ECHO MEAS - RAP SYSTOLE: 3 MMHG
BH CV ECHO MEAS - RVSP: 13 MMHG
BH CV ECHO MEAS - SI(MOD-SP2): 34 ML/M2
BH CV ECHO MEAS - SI(MOD-SP4): 27.6 ML/M2
BH CV ECHO MEAS - SV(LVOT): 57.7 ML
BH CV ECHO MEAS - SV(MOD-SP2): 64 ML
BH CV ECHO MEAS - SV(MOD-SP4): 51.9 ML
BH CV ECHO MEAS - TAPSE (>1.6): 1.56 CM
BH CV ECHO MEAS - TR MAX PG: 10.2 MMHG
BH CV ECHO MEAS - TR MAX VEL: 160 CM/SEC
BH CV ECHO MEASUREMENTS AVERAGE E/E' RATIO: 10.4
BH CV VAS BP RIGHT ARM: NORMAL MMHG
BH CV XLRA - RV BASE: 2.6 CM
BH CV XLRA - RV LENGTH: 6.4 CM
BH CV XLRA - RV MID: 1.9 CM
BH CV XLRA - TDI S': 14.1 CM/SEC
LEFT ATRIUM VOLUME INDEX: 32 ML/M2
MAXIMAL PREDICTED HEART RATE: 152 BPM
STRESS TARGET HR: 129 BPM

## 2022-09-01 PROCEDURE — 93306 TTE W/DOPPLER COMPLETE: CPT | Performed by: INTERNAL MEDICINE

## 2022-09-01 PROCEDURE — 93306 TTE W/DOPPLER COMPLETE: CPT

## 2022-09-02 ENCOUNTER — TELEPHONE (OUTPATIENT)
Dept: CARDIOLOGY | Facility: CLINIC | Age: 67
End: 2022-09-02

## 2022-09-02 NOTE — TELEPHONE ENCOUNTER
----- Message from Sim Connors MD sent at 9/1/2022  4:32 PM EDT -----  Can we let the patient know that echo showed normal pumping function of her heart.  She does have some calcification of her aortic valve and mild to moderate amount of leakage or regurgitation.  This is not anything that requires intervention or treatment at this time.  I will repeat an echo in a couple of years to reassess it.

## 2022-09-06 DIAGNOSIS — R01.1 HEART MURMUR: ICD-10-CM

## 2022-09-12 ENCOUNTER — HOSPITAL ENCOUNTER (OUTPATIENT)
Dept: MAMMOGRAPHY | Facility: HOSPITAL | Age: 67
Discharge: HOME | End: 2022-09-12
Admitting: RADIOLOGY

## 2022-09-12 DIAGNOSIS — R92.8 ABNORMAL MAMMOGRAM: ICD-10-CM

## 2022-09-12 PROCEDURE — 77065 DX MAMMO INCL CAD UNI: CPT | Performed by: RADIOLOGY

## 2022-09-12 PROCEDURE — G0279 TOMOSYNTHESIS, MAMMO: HCPCS

## 2022-09-12 PROCEDURE — 77061 BREAST TOMOSYNTHESIS UNI: CPT | Performed by: RADIOLOGY

## 2022-09-12 PROCEDURE — 77065 DX MAMMO INCL CAD UNI: CPT

## 2023-05-22 ENCOUNTER — TRANSCRIBE ORDERS (OUTPATIENT)
Dept: OBSTETRICS AND GYNECOLOGY | Facility: CLINIC | Age: 68
End: 2023-05-22
Payer: COMMERCIAL

## 2023-05-22 DIAGNOSIS — Z12.31 ENCOUNTER FOR SCREENING MAMMOGRAM FOR BREAST CANCER: Primary | ICD-10-CM

## 2023-08-21 NOTE — PROGRESS NOTES
[facility] Cardiology  Follow Up Visit  Alexandrea Wiggins  1955    VISIT DATE:  23    PCP:   South Dickerson MD  [address] [facility] [address]          CC:  Heart Murmur      Problem List:  1.  Previous episode of transient global amnesia  2.  Mild to moderate aortic valve regurgitation  3.  Hypertension  4.  Hyperlipidemia     Echo    Left ventricular ejection fraction appears to be 56 - 60%.  There is moderate calcification of the aortic valve. Mild to moderate aortic valve regurgitation is present. No hemodynamically significant aortic valve stenosis is present.  Mild mitral annular calcification is present. Mild mitral valve regurgitation is present       History of Present Illness:  Alexandrea Wiggins  Is a 67 y.o. female with pertinent cardiac history detailed above.  She has been feeling well with no new cardiac complaints.  Lipid panel from last year did show her LDL was elevated at 133, HDL was 101.  Today her blood pressure was elevated 182/90.  At her recent OB visit it was 140 systolic.  She states at home and often will run in the 140 systolic.  70s diastolic.  No new chest pain or dyspnea.  We discussed last year's echo results      Patient Active Problem List    Diagnosis Date Noted    Acute encephalopathy 2019    Nausea & vomiting 2019       No Known Allergies    Social History     Socioeconomic History    Marital status:     Number of children: 2   Tobacco Use    Smoking status: Former     Packs/day: 1.00     Types: Cigarettes     Quit date: 1990     Years since quittin.6    Smokeless tobacco: Never   Vaping Use    Vaping Use: Never used   Substance and Sexual Activity    Alcohol use: Yes     Comment: occas    Drug use: No    Sexual activity: Not Currently     Birth control/protection: Post-menopausal       Family History   Problem Relation Age of Onset    Heart disease Father     Hypertension Father     Prostate cancer Father      "Hypertension Mother     Crohn's disease Mother     Breast cancer Sister 53    Lung cancer Sister     Ovarian cancer Neg Hx     Uterine cancer Neg Hx     Colon cancer Neg Hx        Current Medications:    Current Outpatient Medications:     Biotin 5000 MCG capsule, Take 1 capsule by mouth Daily., Disp: , Rfl:     multivitamin with minerals tablet tablet, Take 1 tablet by mouth Daily., Disp: , Rfl:     vitamin D3 125 MCG (5000 UT) capsule capsule, Take 1 capsule by mouth Daily., Disp: , Rfl:     Zinc 50 MG tablet, Take 1 tablet by mouth Daily., Disp: , Rfl:     losartan (COZAAR) 25 MG tablet, Take 1 tablet by mouth Daily., Disp: 30 tablet, Rfl: 6    rosuvastatin (CRESTOR) 20 MG tablet, Take 1 tablet by mouth Daily., Disp: 30 tablet, Rfl: 11     Review of Systems   Cardiovascular: Negative.    Respiratory: Negative.       Vitals:    08/22/23 0956   BP: (!) 182/90   BP Location: Right arm   Patient Position: Sitting   Cuff Size: Adult   Pulse: 73   SpO2: 98%   Weight: 92 kg (202 lb 12.8 oz)   Height: 157.5 cm (62\")       Physical Exam  Constitutional:       Appearance: Normal appearance.   Neck:      Vascular: Carotid bruit (Radiation of aortic murmur to the carotids) present.   Cardiovascular:      Rate and Rhythm: Normal rate and regular rhythm.      Heart sounds: Murmur heard.      Comments: 2/6 systolic murmur  Pulmonary:      Effort: Pulmonary effort is normal.      Breath sounds: Normal breath sounds.   Musculoskeletal:      Right lower leg: No edema.      Left lower leg: No edema.   Neurological:      Mental Status: She is alert.       Diagnostic Data:  Procedures  Lab Results   Component Value Date    TRIG 62 03/19/2019    HDL 79 (H) 03/19/2019     Lab Results   Component Value Date    GLUCOSE 96 03/19/2019    BUN 13 03/19/2019    CREATININE 0.77 03/19/2019     03/19/2019    K 4.4 03/19/2019     03/19/2019    CO2 27.0 03/19/2019     Lab Results   Component Value Date    HGBA1C 5.70 (H) 03/19/2019 "     No results found for: WBC, HGB, HCT, PLT    Assessment:   Diagnosis Plan   1. Hyperlipidemia, unspecified hyperlipidemia type  Comprehensive Metabolic Panel    Lipid Panel          Plan:    1.  Aortic valve regurgitation  -EF 56 to 60%.  Moderate aortic valve calcification with mild to moderate regurgitation.  No stenosis     2.  Hyperlipidemia  -Based on patient's last LDL of 133 and now presence of hypertension she does have elevated ASCVD risk, benefit from statin therapy.  -Start Crestor 20 mg daily and check follow-up lipids in 3 months      3.  HTN  -Starting losartan 25 mg daily.  She will let us know if her blood pressure is remaining above 140 systolic at home.    Follow-up in 4 months.         ACP discussion was held with the patient during this visit. Patient does not have an advance directive, declines further assistance.      Sim Connors MD [facility]

## 2023-08-22 ENCOUNTER — OFFICE VISIT (OUTPATIENT)
Dept: CARDIOLOGY | Facility: CLINIC | Age: 68
End: 2023-08-22
Payer: COMMERCIAL

## 2023-08-22 VITALS
WEIGHT: 202.8 LBS | DIASTOLIC BLOOD PRESSURE: 90 MMHG | OXYGEN SATURATION: 98 % | HEIGHT: 62 IN | SYSTOLIC BLOOD PRESSURE: 182 MMHG | HEART RATE: 73 BPM | BODY MASS INDEX: 37.32 KG/M2

## 2023-08-22 DIAGNOSIS — E78.5 HYPERLIPIDEMIA, UNSPECIFIED HYPERLIPIDEMIA TYPE: Primary | ICD-10-CM

## 2023-08-22 PROCEDURE — 99214 OFFICE O/P EST MOD 30 MIN: CPT | Performed by: INTERNAL MEDICINE

## 2023-08-22 RX ORDER — ROSUVASTATIN CALCIUM 20 MG/1
20 TABLET, COATED ORAL DAILY
Qty: 30 TABLET | Refills: 11 | Status: SHIPPED | OUTPATIENT
Start: 2023-08-22

## 2023-08-22 RX ORDER — LOSARTAN POTASSIUM 25 MG/1
25 TABLET ORAL DAILY
Qty: 30 TABLET | Refills: 6 | Status: SHIPPED | OUTPATIENT
Start: 2023-08-22

## 2024-01-02 ENCOUNTER — OFFICE VISIT (OUTPATIENT)
Dept: CARDIOLOGY | Facility: CLINIC | Age: 69
End: 2024-01-02
Payer: COMMERCIAL

## 2024-01-02 VITALS
HEART RATE: 67 BPM | BODY MASS INDEX: 35.26 KG/M2 | DIASTOLIC BLOOD PRESSURE: 76 MMHG | HEIGHT: 63 IN | SYSTOLIC BLOOD PRESSURE: 148 MMHG | OXYGEN SATURATION: 96 % | WEIGHT: 199 LBS

## 2024-01-02 DIAGNOSIS — I10 HYPERTENSION, UNSPECIFIED TYPE: Primary | ICD-10-CM

## 2024-01-02 PROCEDURE — 99214 OFFICE O/P EST MOD 30 MIN: CPT | Performed by: INTERNAL MEDICINE

## 2024-01-02 RX ORDER — SACCHAROMYCES BOULARDII 250 MG
250 CAPSULE ORAL 2 TIMES DAILY
COMMUNITY

## 2024-01-02 RX ORDER — LANOLIN ALCOHOL/MO/W.PET/CERES
1000 CREAM (GRAM) TOPICAL DAILY
COMMUNITY

## 2024-01-02 RX ORDER — CETIRIZINE HYDROCHLORIDE 5 MG/1
5 TABLET ORAL DAILY
COMMUNITY

## 2024-01-02 RX ORDER — LANOLIN ALCOHOL/MO/W.PET/CERES
400 CREAM (GRAM) TOPICAL DAILY
COMMUNITY

## 2024-01-02 RX ORDER — HYDROCHLOROTHIAZIDE 12.5 MG/1
12.5 TABLET ORAL DAILY
Qty: 30 TABLET | Refills: 6 | Status: SHIPPED | OUTPATIENT
Start: 2024-01-02

## 2024-01-02 NOTE — PROGRESS NOTES
[facility] Cardiology  Follow Up Visit  Alexandrea Wiggins  1955    VISIT DATE:  24    PCP:   South Dickerson MD  [address] [facility] [place] [address]          CC:  Hyperlipidemia, unspecified hyperlipidemia type      Problem List:  1.  Previous episode of transient global amnesia  2.  Mild to moderate aortic valve regurgitation  3.  Hypertension  4.  Hyperlipidemia     Echo    Left ventricular ejection fraction appears to be 56 - 60%.  There is moderate calcification of the aortic valve. Mild to moderate aortic valve regurgitation is present. No hemodynamically significant aortic valve stenosis is present.  Mild mitral annular calcification is present. Mild mitral valve regurgitation is present    History of Present Illness:  Alexandrea Wiggins  Is a 68 y.o. female with pertinent cardiac history detailed above.  Patient started on losartan and Crestor at last visit.  Blood pressure improved from last visit but systolic still above 140.  At home it sounds like ranges 1 30-1 50 systolic.  She has a mild amount of swelling in the calves.  She did get lipids drawn last week at [facility].  We will request these records.  No other new cardiac complaints.      Patient Active Problem List    Diagnosis Date Noted    Acute encephalopathy 2019    Nausea & vomiting 2019       No Known Allergies    Social History     Socioeconomic History    Marital status:     Number of children: 2   Tobacco Use    Smoking status: Former     Packs/day: 1     Types: Cigarettes     Quit date:      Years since quittin.0    Smokeless tobacco: Never   Vaping Use    Vaping Use: Never used   Substance and Sexual Activity    Alcohol use: Yes     Comment: occas    Drug use: No    Sexual activity: Not Currently     Birth control/protection: Post-menopausal       Family History   Problem Relation Age of Onset    Heart disease Father     Hypertension Father     Prostate cancer  "Father     Hypertension Mother     Crohn's disease Mother     Breast cancer Sister 53    Lung cancer Sister     Ovarian cancer Neg Hx     Uterine cancer Neg Hx     Colon cancer Neg Hx        Current Medications:    Current Outpatient Medications:     Biotin 5000 MCG capsule, Take 1 capsule by mouth Daily., Disp: , Rfl:     cetirizine (zyrTEC) 5 MG tablet, Take 1 tablet by mouth Daily., Disp: , Rfl:     folic acid (FOLVITE) 400 MCG tablet, Take 1 tablet by mouth Daily., Disp: , Rfl:     losartan (COZAAR) 25 MG tablet, Take 1 tablet by mouth Daily., Disp: 30 tablet, Rfl: 6    multivitamin with minerals tablet tablet, Take 1 tablet by mouth Daily., Disp: , Rfl:     rosuvastatin (CRESTOR) 20 MG tablet, Take 1 tablet by mouth Daily., Disp: 30 tablet, Rfl: 11    saccharomyces boulardii (FLORASTOR) 250 MG capsule, Take 1 capsule by mouth 2 (Two) Times a Day. 2 gummies daily, Disp: , Rfl:     vitamin B-12 (CYANOCOBALAMIN) 1000 MCG tablet, Take 1 tablet by mouth Daily., Disp: , Rfl:     vitamin D3 125 MCG (5000 UT) capsule capsule, Take 1 capsule by mouth Daily., Disp: , Rfl:     hydroCHLOROthiazide (HYDRODIURIL) 12.5 MG tablet, Take 1 tablet by mouth Daily., Disp: 30 tablet, Rfl: 6    Zinc 50 MG tablet, Take 1 tablet by mouth Daily. (Patient not taking: Reported on 1/2/2024), Disp: , Rfl:      Review of Systems   Cardiovascular:  Positive for leg swelling. Negative for chest pain, dyspnea on exertion, orthopnea and palpitations.   Respiratory:  Negative for shortness of breath.        Vitals:    01/02/24 1118   BP: 148/76   BP Location: Right arm   Patient Position: Sitting   Pulse: 67   SpO2: 96%   Weight: 90.3 kg (199 lb)   Height: 160 cm (63\")       Physical Exam  Constitutional:       Appearance: Normal appearance.   Cardiovascular:      Rate and Rhythm: Normal rate and regular rhythm.      Heart sounds: Murmur (2/6 systolic) heard.   Abdominal:      Palpations: Abdomen is soft.   Musculoskeletal:      Right lower leg: " "Edema present.      Left lower leg: Edema present.      Comments: Trace lower extremity edema bilaterally   Neurological:      General: No focal deficit present.      Mental Status: She is alert.         Diagnostic Data:  Procedures  Lab Results   Component Value Date    TRIG 62 03/19/2019    HDL 79 (H) 03/19/2019     Lab Results   Component Value Date    GLUCOSE 96 03/19/2019    BUN 13 03/19/2019    CREATININE 0.77 03/19/2019     03/19/2019    K 4.4 03/19/2019     03/19/2019    CO2 27.0 03/19/2019     Lab Results   Component Value Date    HGBA1C 5.70 (H) 03/19/2019     No results found for: \"WBC\", \"HGB\", \"HCT\", \"PLT\"    Assessment:  No diagnosis found.    Plan:    1.  Aortic valve regurgitation  -EF 56 to 60%.  Moderate aortic valve calcification with mild to moderate regurgitation.  No stenosis.  Last echo was September 2022.  Could repeat in follow-up 2024     2.  Hyperlipidemia  -crestor started  last visit  -Check follow-up lipids, drawn at [facility] will request records    Addendum: Total 184, , LDL 61.     3.  HTN  -losartan 25mg  -Add HCTZ 12.5 mg to further lower blood pressure and help with mild edema.    Follow-up in about 8 months.      ACP discussion was held with the patient during this visit. Patient does not have an advance directive, declines further assistance.      Sim Connors MD FAC     "

## 2024-01-04 ENCOUNTER — TELEPHONE (OUTPATIENT)
Dept: CARDIOLOGY | Facility: CLINIC | Age: 69
End: 2024-01-04
Payer: COMMERCIAL

## 2024-01-04 NOTE — TELEPHONE ENCOUNTER
----- Message from Sim Connors MD sent at 1/4/2024 12:11 PM EST -----  Can we let patient know that cholesterol looks good.  Do not need to make other changes in medications.

## 2024-03-25 RX ORDER — LOSARTAN POTASSIUM 25 MG/1
25 TABLET ORAL DAILY
Qty: 90 TABLET | Refills: 3 | Status: SHIPPED | OUTPATIENT
Start: 2024-03-25

## 2024-07-05 RX ORDER — PRAVASTATIN SODIUM 20 MG
20 TABLET ORAL DAILY
Qty: 90 TABLET | Refills: 3 | Status: SHIPPED | OUTPATIENT
Start: 2024-07-05

## 2024-07-12 ENCOUNTER — HOSPITAL ENCOUNTER (OUTPATIENT)
Dept: MAMMOGRAPHY | Facility: HOSPITAL | Age: 69
Discharge: HOME | End: 2024-07-12
Admitting: OBSTETRICS & GYNECOLOGY
Payer: COMMERCIAL

## 2024-07-12 DIAGNOSIS — Z12.39 ENCOUNTER FOR BREAST CANCER SCREENING USING NON-MAMMOGRAM MODALITY: ICD-10-CM

## 2024-07-12 PROCEDURE — 77063 BREAST TOMOSYNTHESIS BI: CPT

## 2024-07-12 PROCEDURE — 77067 SCR MAMMO BI INCL CAD: CPT

## 2024-07-31 RX ORDER — HYDROCHLOROTHIAZIDE 12.5 MG/1
12.5 TABLET ORAL DAILY
Qty: 30 TABLET | Refills: 6 | Status: SHIPPED | OUTPATIENT
Start: 2024-07-31

## 2024-09-03 NOTE — PROGRESS NOTES
[facility]  Follow Up Visit  Alexandrea Wiggins  1955    VISIT DATE:  24    PCP:   South Dickerson MD  [address] [address]          CC:  Hypertension, unspecified type      Problem List:  1.  Previous episode of transient global amnesia  2.  Mild to moderate aortic valve regurgitation  3.  Hypertension  4.  Hyperlipidemia     Echo    Left ventricular ejection fraction appears to be 56 - 60%.  There is moderate calcification of the aortic valve. Mild to moderate aortic valve regurgitation is present. No hemodynamically significant aortic valve stenosis is present.  Mild mitral annular calcification is present. Mild mitral valve regurgitation is present        History of Present Illness:  Alexandrea Wiggins  Is a 69 y.o. female with pertinent cardiac history detailed above.  Patient states she is doing doing well.  Does have some mild ankle edema.  No chest pain or dyspnea.  EKG today sinus bradycardia otherwise normal.  Blood pressure is elevated in the office but she states typically is controlled at home with range 110-130 systolic.  She is compliant with low-dose losartan and HCTZ.  No other issues or concerns today      Patient Active Problem List    Diagnosis Date Noted    Acute encephalopathy 2019    Nausea & vomiting 2019       No Known Allergies    Social History     Socioeconomic History    Marital status:     Number of children: 2   Tobacco Use    Smoking status: Former     Current packs/day: 0.00     Average packs/day: 1 pack/day for 10.0 years (10.0 ttl pk-yrs)     Types: Cigarettes     Quit date:      Years since quittin.6    Smokeless tobacco: Never   Vaping Use    Vaping status: Never Used   Substance and Sexual Activity    Alcohol use: Yes     Comment: occasionally    Drug use: No    Sexual activity: Not Currently     Birth control/protection: Post-menopausal       Family History   Problem Relation Age of Onset     "Heart disease Father     Hypertension Father     Prostate cancer Father     Hypertension Mother     Crohn's disease Mother     Breast cancer Sister 53    Lung cancer Sister     Ovarian cancer Neg Hx     Uterine cancer Neg Hx     Colon cancer Neg Hx        Current Medications:    Current Outpatient Medications:     Biotin 5000 MCG capsule, Take 1 capsule by mouth Daily., Disp: , Rfl:     cetirizine (zyrTEC) 5 MG tablet, Take 1 tablet by mouth Daily., Disp: , Rfl:     folic acid (FOLVITE) 400 MCG tablet, Take 1 tablet by mouth Daily., Disp: , Rfl:     hydroCHLOROthiazide 12.5 MG tablet, Take 1 tablet by mouth Daily., Disp: 30 tablet, Rfl: 6    losartan (COZAAR) 25 MG tablet, TAKE 1 TABLET BY MOUTH ONCE A DAY., Disp: 90 tablet, Rfl: 3    multivitamin with minerals tablet tablet, Take 1 tablet by mouth Daily., Disp: , Rfl:     pravastatin (Pravachol) 20 MG tablet, Take 1 tablet by mouth Daily., Disp: 90 tablet, Rfl: 3    saccharomyces boulardii (FLORASTOR) 250 MG capsule, Take 1 capsule by mouth 2 (Two) Times a Day. 2 gummies daily, Disp: , Rfl:     vitamin B-12 (CYANOCOBALAMIN) 1000 MCG tablet, Take 1 tablet by mouth Daily., Disp: , Rfl:     vitamin D3 125 MCG (5000 UT) capsule capsule, Take 1 capsule by mouth Daily., Disp: , Rfl:     Zinc 50 MG tablet, Take 1 tablet by mouth Daily., Disp: , Rfl:      Review of Systems   Cardiovascular:  Positive for leg swelling. Negative for chest pain and dyspnea on exertion.   Respiratory: Negative.         Vitals:    09/04/24 0906   BP: 160/70   BP Location: Right arm   Patient Position: Sitting   Cuff Size: Adult   Pulse: 54   SpO2: 99%   Weight: 89.8 kg (198 lb)   Height: 160 cm (62.99\")       Physical Exam  Constitutional:       Appearance: Normal appearance.   Cardiovascular:      Rate and Rhythm: Regular rhythm. Bradycardia present.      Heart sounds: Murmur (2/6 diastolic murmur) heard.   Pulmonary:      Effort: Pulmonary effort is normal.      Breath sounds: Normal breath " "sounds.   Musculoskeletal:      Right lower leg: Edema present.      Left lower leg: Edema present.      Comments: Trace lower extremity edema bilateral   Neurological:      Mental Status: She is alert.         Diagnostic Data:    ECG 12 Lead    Date/Time: 9/4/2024 9:41 AM  Performed by: Sim Connors MD    Authorized by: Sim Connors MD  Comparison: compared with previous ECG from 8/16/2022  Rhythm: sinus bradycardia  Rate: bradycardic  BPM: 54  QRS axis: normal  Other findings: non-specific ST-T wave changes        Lab Results   Component Value Date    TRIG 62 03/19/2019    HDL 79 (H) 03/19/2019     Lab Results   Component Value Date    GLUCOSE 96 03/19/2019    BUN 13 03/19/2019    CREATININE 0.77 03/19/2019     03/19/2019    K 4.4 03/19/2019     03/19/2019    CO2 27.0 03/19/2019     Lab Results   Component Value Date    HGBA1C 5.70 (H) 03/19/2019     No results found for: \"WBC\", \"HGB\", \"HCT\", \"PLT\"    Assessment:  No diagnosis found.    Plan:    1.  Aortic valve regurgitation  -EF 56 to 60%.  Moderate aortic valve calcification with mild to moderate regurgitation.  No stenosis.  Last echo was September 2022.  -Will schedule a follow-up echocardiogram with her next appointment in May 2025     2.  Hyperlipidemia  -pravastatin 20  -Last lipids Total 184, , LDL 61.  -Ordered repeat lipids     3.  HTN  -losartan 25mg,  HCTZ 12.5  -elevated in office, home BP measurements have been 110-130  -Will increase HCTZ to 25 mg  -Check CMP    ACP discussion was held with the patient during this visit. Patient does not have an advance directive, declines further assistance.      Sim Connors MD FACC     "

## 2024-09-04 ENCOUNTER — PATIENT ROUNDING (BHMG ONLY) (OUTPATIENT)
Dept: CARDIOLOGY | Facility: CLINIC | Age: 69
End: 2024-09-04
Payer: COMMERCIAL

## 2024-09-04 ENCOUNTER — OFFICE VISIT (OUTPATIENT)
Dept: CARDIOLOGY | Facility: CLINIC | Age: 69
End: 2024-09-04
Payer: COMMERCIAL

## 2024-09-04 VITALS
HEIGHT: 63 IN | BODY MASS INDEX: 35.08 KG/M2 | HEART RATE: 54 BPM | WEIGHT: 198 LBS | DIASTOLIC BLOOD PRESSURE: 70 MMHG | OXYGEN SATURATION: 99 % | SYSTOLIC BLOOD PRESSURE: 160 MMHG

## 2024-09-04 DIAGNOSIS — I10 PRIMARY HYPERTENSION: Primary | ICD-10-CM

## 2024-09-04 DIAGNOSIS — E78.5 HYPERLIPIDEMIA, UNSPECIFIED HYPERLIPIDEMIA TYPE: ICD-10-CM

## 2024-09-04 DIAGNOSIS — I35.1 NONRHEUMATIC AORTIC VALVE INSUFFICIENCY: ICD-10-CM

## 2024-09-04 PROCEDURE — 99214 OFFICE O/P EST MOD 30 MIN: CPT | Performed by: INTERNAL MEDICINE

## 2024-09-04 PROCEDURE — 93000 ELECTROCARDIOGRAM COMPLETE: CPT | Performed by: INTERNAL MEDICINE

## 2024-09-04 RX ORDER — HYDROCHLOROTHIAZIDE 25 MG/1
25 TABLET ORAL DAILY
Qty: 30 TABLET | Refills: 11 | Status: SHIPPED | OUTPATIENT
Start: 2024-09-04

## 2024-09-04 NOTE — PROGRESS NOTES
September 4, 2024    Hello, may I speak with Alexandrea Wiggins?    My name is Tammy KINGSTON      I am  with [facility] CARDIOLOGY  [address]  [facility] [address] [phone].    Before we get started may I verify your date of birth? 1955    I am calling to officially welcome you to our practice and ask about your recent visit. Is this a good time to talk? yes    Tell me about your visit with us. What things went well?  Everything       We're always looking for ways to make our patients' experiences even better. Do you have recommendations on ways we may improve?  no    Overall were you satisfied with your first visit to our practice? yes       I appreciate you taking the time to speak with me today. Is there anything else I can do for you? no      Thank you, and have a great day.

## 2025-03-28 RX ORDER — LOSARTAN POTASSIUM 25 MG/1
25 TABLET ORAL DAILY
Qty: 30 TABLET | Refills: 0 | Status: SHIPPED | OUTPATIENT
Start: 2025-03-28

## 2025-03-28 NOTE — TELEPHONE ENCOUNTER
Called patient and informed her of need for updated labs, informed her I have sent 1 month supply of her Losartan until she can have this completed -she verbalizes understanding states she will have these done.     Patient asks if lab orders can be sent to her. Informed patient I will mail these. She is agreeable.

## 2025-04-29 RX ORDER — LOSARTAN POTASSIUM 25 MG/1
25 TABLET ORAL DAILY
Qty: 30 TABLET | Refills: 0 | OUTPATIENT
Start: 2025-04-29

## 2025-05-01 ENCOUNTER — TELEPHONE (OUTPATIENT)
Dept: CARDIOLOGY | Facility: CLINIC | Age: 70
End: 2025-05-01
Payer: COMMERCIAL

## 2025-05-01 RX ORDER — LOSARTAN POTASSIUM 25 MG/1
25 TABLET ORAL DAILY
Qty: 15 TABLET | Refills: 0 | Status: SHIPPED | OUTPATIENT
Start: 2025-05-01

## 2025-05-01 NOTE — TELEPHONE ENCOUNTER
Patient called and states she had labs completed at [facility]. Informed her we have not received them but that I will call and request these.   Called [facility] requested labs- states she will fax these to office.

## 2025-05-15 RX ORDER — LOSARTAN POTASSIUM 25 MG/1
25 TABLET ORAL DAILY
Qty: 30 TABLET | Refills: 0 | Status: SHIPPED | OUTPATIENT
Start: 2025-05-15

## 2025-05-19 NOTE — PROGRESS NOTES
[facility] Cardiology  Follow Up Visit  Alexandrea Wiggins  1955    VISIT DATE:  25    PCP:   [place] Dickerson MD  [address] [address]          CC:  Primary hypertension      Problem List:  1.  Previous episode of transient global amnesia  2.  Mild to moderate aortic valve regurgitation  3.  Hypertension  4.  Hyperlipidemia     Echo    Left ventricular ejection fraction appears to be 56 - 60%.  There is moderate calcification of the aortic valve. Mild to moderate aortic valve regurgitation is present. No hemodynamically significant aortic valve stenosis is present.  Mild mitral annular calcification is present. Mild mitral valve regurgitation is present       History of Present Illness:  Alexandrea Wiggins  Is a 69 y.o. female with pertinent cardiac history detailed above.  Patient had an echo today to reassess aortic valve regurgitation.  The echo shows mild to moderate aortic stenosis and moderate aortic valve regurgitation.  Symptomatically she is doing well with no new complaints.  Hypertensive in office but she has a well detailed BP log going back through March with controlled readings.  Her most recent lipid panel did show an LDL above goal and we discussed increasing her pravastatin dosing.      Patient Active Problem List    Diagnosis Date Noted    Acute encephalopathy 2019    Nausea & vomiting 2019       No Known Allergies    Social History     Socioeconomic History    Marital status:     Number of children: 2   Tobacco Use    Smoking status: Former     Current packs/day: 0.00     Average packs/day: 1 pack/day for 10.0 years (10.0 ttl pk-yrs)     Types: Cigarettes     Quit date:      Years since quittin.4    Smokeless tobacco: Never   Vaping Use    Vaping status: Never Used   Substance and Sexual Activity    Alcohol use: Yes     Comment: occasionally    Drug use: No    Sexual activity: Not Currently     Birth control/protection:  "Post-menopausal       Family History   Problem Relation Age of Onset    Heart disease Father     Hypertension Father     Prostate cancer Father     Hypertension Mother     Crohn's disease Mother     Breast cancer Sister 53    Lung cancer Sister     Ovarian cancer Neg Hx     Uterine cancer Neg Hx     Colon cancer Neg Hx        Current Medications:    Current Outpatient Medications:     Biotin 5000 MCG capsule, Take 1 capsule by mouth Daily., Disp: , Rfl:     cetirizine (zyrTEC) 5 MG tablet, Take 1 tablet by mouth Daily., Disp: , Rfl:     hydroCHLOROthiazide 25 MG tablet, Take 1 tablet by mouth Daily., Disp: 90 tablet, Rfl: 3    losartan (COZAAR) 25 MG tablet, Take 1 tablet by mouth Daily., Disp: 90 tablet, Rfl: 3    multivitamin with minerals tablet tablet, Take 1 tablet by mouth Daily., Disp: , Rfl:     pravastatin (PRAVACHOL) 40 MG tablet, Take 1 tablet by mouth Daily., Disp: 90 tablet, Rfl: 3    saccharomyces boulardii (FLORASTOR) 250 MG capsule, Take 1 capsule by mouth 2 (Two) Times a Day. 2 gummies daily, Disp: , Rfl:     vitamin B-12 (CYANOCOBALAMIN) 1000 MCG tablet, Take 1 tablet by mouth Daily., Disp: , Rfl:     vitamin D3 125 MCG (5000 UT) capsule capsule, Take 1 capsule by mouth Daily., Disp: , Rfl:     folic acid (FOLVITE) 400 MCG tablet, Take 1 tablet by mouth Daily. (Patient not taking: Reported on 5/20/2025), Disp: , Rfl:     Zinc 50 MG tablet, Take 1 tablet by mouth Daily. (Patient not taking: Reported on 5/20/2025), Disp: , Rfl:      Review of Systems   Cardiovascular: Negative.    Respiratory: Negative.         Vitals:    05/20/25 1504   BP: 164/68   BP Location: Right arm   Patient Position: Sitting   Pulse: 81   SpO2: 93%   Weight: 89.8 kg (198 lb)   Height: 160 cm (63\")       Physical Exam  Constitutional:       Appearance: Normal appearance.   Cardiovascular:      Rate and Rhythm: Normal rate and regular rhythm.      Heart sounds: Murmur (3/6 systolic murmur with radiation to the carotids) heard. " "  Pulmonary:      Effort: Pulmonary effort is normal.      Breath sounds: Normal breath sounds.   Musculoskeletal:      Right lower leg: No edema.      Left lower leg: No edema.   Neurological:      General: No focal deficit present.      Mental Status: She is alert.         Diagnostic Data:  Procedures  Lab Results   Component Value Date    TRIG 62 03/19/2019    HDL 79 (H) 03/19/2019     Lab Results   Component Value Date    GLUCOSE 96 03/19/2019    BUN 13 03/19/2019    CREATININE 0.77 03/19/2019     03/19/2019    K 4.4 03/19/2019     03/19/2019    CO2 27.0 03/19/2019     Lab Results   Component Value Date    HGBA1C 5.70 (H) 03/19/2019     No results found for: \"WBC\", \"HGB\", \"HCT\", \"PLT\"    Assessment:   Diagnosis Plan   1. Nonrheumatic aortic valve insufficiency  Adult Transthoracic Echo Complete W/ Cont if Necessary Per Protocol      2. Hyperlipidemia, unspecified hyperlipidemia type        3. Hypertension, unspecified type            Plan:    1.  Aortic valve regurgitation  - Echo today.  EF 62%, moderate aortic valve regurgitation, mild to moderate aortic valve stenosis  -Plan follow-up echo in 1 year with next appointment.  Continue to monitor symptoms.     2.  Hyperlipidemia  - Labs 2025 total cholesterol 228, triglycerides 44, HDL 99, :  -Will plan to increase pravastatin to 40mg     3.  HTN  -losartan 25mg,  HCTZ 25  -Normal sodium, potassium, creatinine  - Blood pressure log shows good control.  Will not make any additional changes today.        ACP discussion was held with the patient during this visit. Patient does not have an advance directive, information provided.      Sim Connors MD [facility]     "

## 2025-05-20 ENCOUNTER — OFFICE VISIT (OUTPATIENT)
Dept: CARDIOLOGY | Facility: CLINIC | Age: 70
End: 2025-05-20
Payer: COMMERCIAL

## 2025-05-20 ENCOUNTER — HOSPITAL ENCOUNTER (OUTPATIENT)
Dept: CARDIOLOGY | Facility: HOSPITAL | Age: 70
Discharge: HOME | End: 2025-05-20
Admitting: INTERNAL MEDICINE
Payer: COMMERCIAL

## 2025-05-20 VITALS
DIASTOLIC BLOOD PRESSURE: 76 MMHG | HEIGHT: 63 IN | SYSTOLIC BLOOD PRESSURE: 182 MMHG | WEIGHT: 198 LBS | BODY MASS INDEX: 35.08 KG/M2

## 2025-05-20 VITALS
HEIGHT: 63 IN | WEIGHT: 198 LBS | SYSTOLIC BLOOD PRESSURE: 164 MMHG | HEART RATE: 81 BPM | BODY MASS INDEX: 35.08 KG/M2 | OXYGEN SATURATION: 93 % | DIASTOLIC BLOOD PRESSURE: 68 MMHG

## 2025-05-20 DIAGNOSIS — E78.5 HYPERLIPIDEMIA, UNSPECIFIED HYPERLIPIDEMIA TYPE: ICD-10-CM

## 2025-05-20 DIAGNOSIS — I10 PRIMARY HYPERTENSION: ICD-10-CM

## 2025-05-20 DIAGNOSIS — I10 HYPERTENSION, UNSPECIFIED TYPE: ICD-10-CM

## 2025-05-20 DIAGNOSIS — I35.1 NONRHEUMATIC AORTIC VALVE INSUFFICIENCY: Primary | ICD-10-CM

## 2025-05-20 LAB
AORTIC DIMENSIONLESS INDEX: 0.43 (DI)
ASCENDING AORTA: 3.3 CM
AV MEAN PRESS GRAD SYS DOP V1V2: 19.8 MMHG
AV VMAX SYS DOP: 303.5 CM/SEC
BH CV ECHO MEAS - 2D AUTO EF SIEMENS: 62 %
BH CV ECHO MEAS - AI P1/2T: 401.5 MSEC
BH CV ECHO MEAS - AO MAX PG: 36.8 MMHG
BH CV ECHO MEAS - AO ROOT DIAM: 3.3 CM
BH CV ECHO MEAS - AO V2 VTI: 73.6 CM
BH CV ECHO MEAS - AVA(I,D): 1.34 CM2
BH CV ECHO MEAS - IVS/LVPW: 1 CM
BH CV ECHO MEAS - IVSD: 1.1 CM
BH CV ECHO MEAS - LA DIMENSION: 3.2 CM
BH CV ECHO MEAS - LAT PEAK E' VEL: 10.8 CM/SEC
BH CV ECHO MEAS - LV MAX PG: 5.8 MMHG
BH CV ECHO MEAS - LV MEAN PG: 2.6 MMHG
BH CV ECHO MEAS - LV V1 MAX: 120.9 CM/SEC
BH CV ECHO MEAS - LV V1 VTI: 31.3 CM
BH CV ECHO MEAS - LVIDD: 4.4 CM
BH CV ECHO MEAS - LVIDS: 2.5 CM
BH CV ECHO MEAS - LVOT AREA: 3.1 CM2
BH CV ECHO MEAS - LVOT DIAM: 2 CM
BH CV ECHO MEAS - LVPWD: 1.1 CM
BH CV ECHO MEAS - MED PEAK E' VEL: 8.2 CM/SEC
BH CV ECHO MEAS - MV A MAX VEL: 87 CM/SEC
BH CV ECHO MEAS - MV DEC SLOPE: 464.2 CM/SEC2
BH CV ECHO MEAS - MV E MAX VEL: 105 CM/SEC
BH CV ECHO MEAS - MV E/A: 1.21
BH CV ECHO MEAS - MV MAX PG: 4.7 MMHG
BH CV ECHO MEAS - MV MEAN PG: 1.8 MMHG
BH CV ECHO MEAS - MV P1/2T: 69 MSEC
BH CV ECHO MEAS - MV V2 VTI: 35.6 CM
BH CV ECHO MEAS - MVA(P1/2T): 3.2 CM2
BH CV ECHO MEAS - MVA(VTI): 2.8 CM2
BH CV ECHO MEAS - PA ACC TIME: 0.09 SEC
BH CV ECHO MEAS - PA V2 MAX: 116.2 CM/SEC
BH CV ECHO MEAS - PI END-D VEL: 98.2 CM/SEC
BH CV ECHO MEAS - PULM A REVS DUR: 0.12 SEC
BH CV ECHO MEAS - PULM A REVS VEL: 30.3 CM/SEC
BH CV ECHO MEAS - PULM DIAS VEL: 53.8 CM/SEC
BH CV ECHO MEAS - PULM S/D: 1.23
BH CV ECHO MEAS - PULM SYS VEL: 66 CM/SEC
BH CV ECHO MEAS - RAP SYSTOLE: 3 MMHG
BH CV ECHO MEAS - RVSP: 19 MMHG
BH CV ECHO MEAS - SV(LVOT): 98.3 ML
BH CV ECHO MEAS - SVI(LVOT): 51.1 ML/M2
BH CV ECHO MEAS - TAPSE (>1.6): 1.93 CM
BH CV ECHO MEAS - TR MAX PG: 16 MMHG
BH CV ECHO MEAS - TR MAX VEL: 196.5 CM/SEC
BH CV ECHO MEASUREMENTS AVERAGE E/E' RATIO: 11.05
BH CV XLRA - RV BASE: 3.7 CM
BH CV XLRA - RV LENGTH: 7.5 CM
BH CV XLRA - RV MID: 2.7 CM
BH CV XLRA - TDI S': 17.5 CM/SEC
IVRT: 70 MS
LEFT ATRIUM VOLUME INDEX: 31.4 ML/M2

## 2025-05-20 PROCEDURE — 93306 TTE W/DOPPLER COMPLETE: CPT

## 2025-05-20 PROCEDURE — 99214 OFFICE O/P EST MOD 30 MIN: CPT | Performed by: INTERNAL MEDICINE

## 2025-05-20 RX ORDER — LOSARTAN POTASSIUM 25 MG/1
25 TABLET ORAL DAILY
Qty: 90 TABLET | Refills: 3 | Status: SHIPPED | OUTPATIENT
Start: 2025-05-20

## 2025-05-20 RX ORDER — PRAVASTATIN SODIUM 40 MG
40 TABLET ORAL DAILY
Qty: 90 TABLET | Refills: 3 | Status: SHIPPED | OUTPATIENT
Start: 2025-05-20

## 2025-05-20 RX ORDER — HYDROCHLOROTHIAZIDE 25 MG/1
25 TABLET ORAL DAILY
Qty: 90 TABLET | Refills: 3 | Status: SHIPPED | OUTPATIENT
Start: 2025-05-20

## 2025-06-09 ENCOUNTER — TRANSCRIBE ORDERS (OUTPATIENT)
Dept: OBSTETRICS AND GYNECOLOGY | Facility: CLINIC | Age: 70
End: 2025-06-09
Payer: COMMERCIAL

## 2025-06-09 DIAGNOSIS — Z12.31 VISIT FOR SCREENING MAMMOGRAM: Primary | ICD-10-CM

## 2025-07-14 ENCOUNTER — HOSPITAL ENCOUNTER (OUTPATIENT)
Dept: MAMMOGRAPHY | Facility: HOSPITAL | Age: 70
Discharge: HOME | End: 2025-07-14
Admitting: OBSTETRICS & GYNECOLOGY
Payer: COMMERCIAL

## 2025-07-14 DIAGNOSIS — Z12.31 VISIT FOR SCREENING MAMMOGRAM: ICD-10-CM

## 2025-07-14 PROCEDURE — 77063 BREAST TOMOSYNTHESIS BI: CPT

## 2025-07-14 PROCEDURE — 77067 SCR MAMMO BI INCL CAD: CPT

## 2025-07-16 ENCOUNTER — OFFICE VISIT (OUTPATIENT)
Dept: OBSTETRICS AND GYNECOLOGY | Facility: CLINIC | Age: 70
End: 2025-07-16
Payer: COMMERCIAL

## 2025-07-16 VITALS
BODY MASS INDEX: 35.01 KG/M2 | DIASTOLIC BLOOD PRESSURE: 42 MMHG | SYSTOLIC BLOOD PRESSURE: 150 MMHG | HEIGHT: 63 IN | WEIGHT: 197.6 LBS

## 2025-07-16 DIAGNOSIS — Z12.39 ENCOUNTER FOR BREAST CANCER SCREENING USING NON-MAMMOGRAM MODALITY: ICD-10-CM

## 2025-07-16 DIAGNOSIS — Z13.820 SCREENING FOR OSTEOPOROSIS: ICD-10-CM

## 2025-07-16 DIAGNOSIS — M85.80 OSTEOPENIA AFTER MENOPAUSE: ICD-10-CM

## 2025-07-16 DIAGNOSIS — Z13.820 OSTEOPOROSIS SCREENING: ICD-10-CM

## 2025-07-16 DIAGNOSIS — Z01.419 WOMEN'S ANNUAL ROUTINE GYNECOLOGICAL EXAMINATION: Primary | ICD-10-CM

## 2025-07-16 DIAGNOSIS — Z78.0 POSTMENOPAUSAL: Primary | ICD-10-CM

## 2025-07-16 DIAGNOSIS — Z78.0 POSTMENOPAUSAL STATUS: ICD-10-CM

## 2025-07-16 DIAGNOSIS — Z78.0 OSTEOPENIA AFTER MENOPAUSE: ICD-10-CM

## 2025-07-16 NOTE — PROGRESS NOTES
Gynecologic Annual Exam Note        GYN Annual Exam     CC - Here for annual exam.        HPI  Alexandrea Wiggins is a 69 y.o. female, , who presents for annual well woman exam as a established patient.  She is postmenopausal.. Denies vaginal bleeding.   There were no changes to her medical or surgical history since her last visit. Marital Status: .  She is not currently sexually active. STD testing recommendations have been explained to the patient and she declines STD testing.    The patient would like to discuss the following complaints today: none    Additional OB/GYN History   On HRT? No    Last Pap : 21. Results: negative. HPV: not done.   Last Completed Pap Smear    This patient has no relevant Health Maintenance data.       History of abnormal Pap smear: no  Family history of uterine, colon, breast, or ovarian cancer: yes - sister had breast cancer  Performs monthly Self-Breast Exam: sometimes  Last mammogram: 25. Done at . There is not a copy in the chart yet.    Last Completed Mammogram            Awaiting Completion       MAMMOGRAM (Every 2 Years) Order placed this encounter      2025  Order placed for Mammo Screening Digital Tomosynthesis Bilateral With CAD by Juliane Ham MD    2024  Mammo Screening Digital Tomosynthesis Bilateral With CAD    2023  Mammo Screening Digital Tomosynthesis Bilateral With CAD    2022  Mammo Diagnostic Digital Tomosynthesis Right With CAD    2022  Mammo Screening Digital Tomosynthesis Bilateral With CAD     Only the first 5 history entries have been loaded, but more history exists.                        Last colonoscopy: has had a cologuard 2 yrs ago wnl    Last Completed Colonoscopy    This patient has no relevant Health Maintenance data.         Her last bone density scan was today ago and results were waiting on results  Exercises Regularly: sometimes  Feelings of Anxiety or Depression: no      Tobacco  Usage?: No       Current Outpatient Medications:     Biotin 5000 MCG capsule, Take 1 capsule by mouth Daily., Disp: , Rfl:     cetirizine (zyrTEC) 5 MG tablet, Take 1 tablet by mouth Daily., Disp: , Rfl:     hydroCHLOROthiazide 25 MG tablet, Take 1 tablet by mouth Daily., Disp: 90 tablet, Rfl: 3    losartan (COZAAR) 25 MG tablet, Take 1 tablet by mouth Daily., Disp: 90 tablet, Rfl: 3    multivitamin with minerals tablet tablet, Take 1 tablet by mouth Daily., Disp: , Rfl:     pravastatin (PRAVACHOL) 40 MG tablet, Take 1 tablet by mouth Daily., Disp: 90 tablet, Rfl: 3    saccharomyces boulardii (FLORASTOR) 250 MG capsule, Take 1 capsule by mouth 2 (Two) Times a Day. 2 gummies daily, Disp: , Rfl:     vitamin B-12 (CYANOCOBALAMIN) 1000 MCG tablet, Take 1 tablet by mouth Daily., Disp: , Rfl:     vitamin D3 125 MCG (5000 UT) capsule capsule, Take 1 capsule by mouth Daily., Disp: , Rfl:     folic acid (FOLVITE) 400 MCG tablet, Take 1 tablet by mouth Daily. (Patient not taking: Reported on 2025), Disp: , Rfl:     Zinc 50 MG tablet, Take 1 tablet by mouth Daily. (Patient not taking: Reported on 2025), Disp: , Rfl:         OB History          2    Para   2    Term   2            AB        Living   2         SAB        IAB        Ectopic        Molar        Multiple        Live Births   2                Past Medical History:   Diagnosis Date    Heart murmur 2022    Hypertension         Past Surgical History:   Procedure Laterality Date    REDUCTION MAMMAPLASTY Bilateral ~        Health Maintenance   Topic Date Due    TDAP/TD VACCINES (1 - Tdap) Never done    Pneumococcal Vaccine 50+ (1 of 1 - PCV) Never done    ZOSTER VACCINE (1 of 2) Never done    LIPID PANEL  2020    HEPATITIS C SCREENING  Never done    ANNUAL PHYSICAL  Never done    PAP SMEAR  2022    COVID-19 Vaccine ( -  season) Never done    COLORECTAL CANCER SCREENING  2025    INFLUENZA VACCINE   "10/01/2025    MAMMOGRAM  07/12/2026    DXA SCAN  07/16/2027       The additional following portions of the patient's history were reviewed and updated as appropriate: allergies, current medications, past family history, past medical history, past social history, past surgical history, and problem list.    Review of Systems    I have reviewed and agree with the HPI, ROS, and historical information as entered above. Juliane Ham MD      Objective   /42   Ht 160 cm (63\")   Wt 89.6 kg (197 lb 9.6 oz)   BMI 35.00 kg/m²     Physical Exam  Vitals and nursing note reviewed. Exam conducted with a chaperone present.   Constitutional:       Appearance: She is well-developed.   HENT:      Head: Normocephalic and atraumatic.   Neck:      Thyroid: No thyroid mass or thyromegaly.   Cardiovascular:      Rate and Rhythm: Normal rate and regular rhythm.      Heart sounds: No murmur heard.  Pulmonary:      Effort: Pulmonary effort is normal. No retractions.      Breath sounds: Normal breath sounds. No wheezing, rhonchi or rales.   Chest:      Chest wall: No mass or tenderness.   Breasts:     Right: Normal. No mass, nipple discharge, skin change or tenderness.      Left: Normal. No mass, nipple discharge, skin change or tenderness.   Abdominal:      General: Bowel sounds are normal.      Palpations: Abdomen is soft. Abdomen is not rigid. There is no mass.      Tenderness: There is no abdominal tenderness. There is no guarding.      Hernia: No hernia is present. There is no hernia in the left inguinal area.   Genitourinary:     Labia:         Right: No rash, tenderness or lesion.         Left: No rash, tenderness or lesion.       Vagina: Normal. No vaginal discharge or lesions.      Cervix: No cervical motion tenderness, discharge, lesion or cervical bleeding.      Uterus: Normal. Not enlarged, not fixed and not tender.       Adnexa:         Right: No mass or tenderness.          Left: No mass or tenderness.        Rectum: " No external hemorrhoid.   Musculoskeletal:      Cervical back: Normal range of motion. No muscular tenderness.   Neurological:      Mental Status: She is alert and oriented to person, place, and time.   Psychiatric:         Behavior: Behavior normal.            Assessment and Plan    Problem List Items Addressed This Visit    None  Visit Diagnoses         Women's annual routine gynecological examination    -  Primary    Relevant Orders    LIQUID-BASED PAP SMEAR WITH HPV GENOTYPING IF ASCUS (LUIS F,COR,MAD)      Encounter for breast cancer screening using non-mammogram modality        Relevant Orders    Mammo Screening Digital Tomosynthesis Bilateral With CAD      Osteoporosis screening          Postmenopausal status                GYN annual well woman exam.   Recommended use of Vitamin D replacement and getting adequate calcium in her diet. (1500mg).  BDS reviewed and FRAX scores do not necessitate further intervention at this time.    Continue yearly mammograph.  Reviewed self breast awareness.  Instructed to call with lumps, pain, or breast discharge.     Reviewed HPV guidelines.  Reviewed exercise as a preventative health measures.  Up to date on colon cancer screening.      Return in about 2 years (around 7/16/2027).         Juliane Ham MD  07/16/2025

## 2025-07-17 LAB — REF LAB TEST METHOD: NORMAL
